# Patient Record
Sex: FEMALE | Race: WHITE | NOT HISPANIC OR LATINO
[De-identification: names, ages, dates, MRNs, and addresses within clinical notes are randomized per-mention and may not be internally consistent; named-entity substitution may affect disease eponyms.]

---

## 2017-01-08 ENCOUNTER — RX RENEWAL (OUTPATIENT)
Age: 77
End: 2017-01-08

## 2017-03-06 ENCOUNTER — APPOINTMENT (OUTPATIENT)
Dept: RHEUMATOLOGY | Facility: CLINIC | Age: 77
End: 2017-03-06

## 2017-03-06 VITALS
OXYGEN SATURATION: 97 % | DIASTOLIC BLOOD PRESSURE: 82 MMHG | BODY MASS INDEX: 29.02 KG/M2 | WEIGHT: 170 LBS | HEIGHT: 64 IN | HEART RATE: 51 BPM | SYSTOLIC BLOOD PRESSURE: 126 MMHG

## 2017-03-06 DIAGNOSIS — I99.8 OTHER DISORDER OF CIRCULATORY SYSTEM: ICD-10-CM

## 2017-03-07 LAB
ALBUMIN SERPL ELPH-MCNC: 4 G/DL
ALP BLD-CCNC: 73 U/L
ALT SERPL-CCNC: 15 U/L
ANION GAP SERPL CALC-SCNC: 14 MMOL/L
AST SERPL-CCNC: 19 U/L
BASOPHILS # BLD AUTO: 0.06 K/UL
BASOPHILS NFR BLD AUTO: 0.8 %
BILIRUB SERPL-MCNC: 0.5 MG/DL
BUN SERPL-MCNC: 20 MG/DL
CALCIUM SERPL-MCNC: 9.9 MG/DL
CHLORIDE SERPL-SCNC: 104 MMOL/L
CO2 SERPL-SCNC: 28 MMOL/L
CREAT SERPL-MCNC: 0.94 MG/DL
CRP SERPL-MCNC: 0.87 MG/DL
EOSINOPHIL # BLD AUTO: 0.32 K/UL
EOSINOPHIL NFR BLD AUTO: 4.1 %
GLUCOSE SERPL-MCNC: 103 MG/DL
HCT VFR BLD CALC: 36.3 %
HGB BLD-MCNC: 11.6 G/DL
IMM GRANULOCYTES NFR BLD AUTO: 0.3 %
LYMPHOCYTES # BLD AUTO: 1.65 K/UL
LYMPHOCYTES NFR BLD AUTO: 21.1 %
MAN DIFF?: NORMAL
MCHC RBC-ENTMCNC: 31.5 PG
MCHC RBC-ENTMCNC: 32 GM/DL
MCV RBC AUTO: 98.6 FL
MONOCYTES # BLD AUTO: 0.54 K/UL
MONOCYTES NFR BLD AUTO: 6.9 %
NEUTROPHILS # BLD AUTO: 5.23 K/UL
NEUTROPHILS NFR BLD AUTO: 66.8 %
PLATELET # BLD AUTO: 353 K/UL
POTASSIUM SERPL-SCNC: 3.7 MMOL/L
PROT SERPL-MCNC: 6.5 G/DL
RBC # BLD: 3.68 M/UL
RBC # FLD: 16.9 %
SODIUM SERPL-SCNC: 146 MMOL/L
WBC # FLD AUTO: 7.82 K/UL

## 2017-05-03 ENCOUNTER — RX RENEWAL (OUTPATIENT)
Age: 77
End: 2017-05-03

## 2017-05-15 ENCOUNTER — APPOINTMENT (OUTPATIENT)
Dept: OBGYN | Facility: CLINIC | Age: 77
End: 2017-05-15

## 2017-05-15 VITALS
SYSTOLIC BLOOD PRESSURE: 132 MMHG | HEIGHT: 64 IN | BODY MASS INDEX: 28.51 KG/M2 | DIASTOLIC BLOOD PRESSURE: 70 MMHG | WEIGHT: 167 LBS

## 2017-05-19 LAB — CYTOLOGY CVX/VAG DOC THIN PREP: NORMAL

## 2017-06-08 ENCOUNTER — FORM ENCOUNTER (OUTPATIENT)
Age: 77
End: 2017-06-08

## 2017-06-08 ENCOUNTER — APPOINTMENT (OUTPATIENT)
Dept: RHEUMATOLOGY | Facility: CLINIC | Age: 77
End: 2017-06-08

## 2017-06-08 VITALS
HEART RATE: 52 BPM | BODY MASS INDEX: 28.17 KG/M2 | DIASTOLIC BLOOD PRESSURE: 68 MMHG | HEIGHT: 64 IN | OXYGEN SATURATION: 97 % | SYSTOLIC BLOOD PRESSURE: 111 MMHG | WEIGHT: 165 LBS | TEMPERATURE: 97 F

## 2017-06-08 LAB
ALBUMIN SERPL ELPH-MCNC: 4.1 G/DL
ALP BLD-CCNC: 76 U/L
ALT SERPL-CCNC: 14 U/L
ANION GAP SERPL CALC-SCNC: 12 MMOL/L
AST SERPL-CCNC: 17 U/L
BASOPHILS # BLD AUTO: 0.08 K/UL
BASOPHILS NFR BLD AUTO: 1 %
BILIRUB SERPL-MCNC: 0.3 MG/DL
BUN SERPL-MCNC: 17 MG/DL
CALCIUM SERPL-MCNC: 9.4 MG/DL
CHLORIDE SERPL-SCNC: 101 MMOL/L
CO2 SERPL-SCNC: 29 MMOL/L
CREAT SERPL-MCNC: 0.92 MG/DL
CRP SERPL-MCNC: 1 MG/DL
EOSINOPHIL # BLD AUTO: 0.3 K/UL
EOSINOPHIL NFR BLD AUTO: 3.7 %
GLUCOSE SERPL-MCNC: 73 MG/DL
HCT VFR BLD CALC: 37.4 %
HGB BLD-MCNC: 11.9 G/DL
IMM GRANULOCYTES NFR BLD AUTO: 0.2 %
LYMPHOCYTES # BLD AUTO: 1.62 K/UL
LYMPHOCYTES NFR BLD AUTO: 20.1 %
MAN DIFF?: NORMAL
MCHC RBC-ENTMCNC: 31.8 GM/DL
MCHC RBC-ENTMCNC: 32.1 PG
MCV RBC AUTO: 100.8 FL
MONOCYTES # BLD AUTO: 0.41 K/UL
MONOCYTES NFR BLD AUTO: 5.1 %
NEUTROPHILS # BLD AUTO: 5.63 K/UL
NEUTROPHILS NFR BLD AUTO: 69.9 %
PLATELET # BLD AUTO: 336 K/UL
POTASSIUM SERPL-SCNC: 3.8 MMOL/L
PROT SERPL-MCNC: 6.9 G/DL
RBC # BLD: 3.71 M/UL
RBC # FLD: 14.6 %
SODIUM SERPL-SCNC: 142 MMOL/L
WBC # FLD AUTO: 8.06 K/UL

## 2017-06-09 ENCOUNTER — OUTPATIENT (OUTPATIENT)
Dept: OUTPATIENT SERVICES | Facility: HOSPITAL | Age: 77
LOS: 1 days | End: 2017-06-09
Payer: MEDICARE

## 2017-06-09 ENCOUNTER — APPOINTMENT (OUTPATIENT)
Dept: RADIOLOGY | Facility: CLINIC | Age: 77
End: 2017-06-09

## 2017-06-09 DIAGNOSIS — M54.2 CERVICALGIA: ICD-10-CM

## 2017-06-09 LAB — 25(OH)D3 SERPL-MCNC: 38.6 NG/ML

## 2017-06-09 PROCEDURE — 72050 X-RAY EXAM NECK SPINE 4/5VWS: CPT

## 2017-08-06 ENCOUNTER — RX RENEWAL (OUTPATIENT)
Age: 77
End: 2017-08-06

## 2017-08-25 ENCOUNTER — APPOINTMENT (OUTPATIENT)
Dept: RHEUMATOLOGY | Facility: CLINIC | Age: 77
End: 2017-08-25
Payer: MEDICARE

## 2017-08-25 PROCEDURE — 96374 THER/PROPH/DIAG INJ IV PUSH: CPT

## 2017-09-14 ENCOUNTER — APPOINTMENT (OUTPATIENT)
Dept: RHEUMATOLOGY | Facility: CLINIC | Age: 77
End: 2017-09-14
Payer: MEDICARE

## 2017-09-14 VITALS
TEMPERATURE: 97.8 F | WEIGHT: 161 LBS | OXYGEN SATURATION: 96 % | SYSTOLIC BLOOD PRESSURE: 96 MMHG | HEIGHT: 64 IN | DIASTOLIC BLOOD PRESSURE: 56 MMHG | HEART RATE: 52 BPM | BODY MASS INDEX: 27.49 KG/M2

## 2017-09-14 LAB
ALBUMIN SERPL ELPH-MCNC: 4.3 G/DL
ALP BLD-CCNC: 82 U/L
ALT SERPL-CCNC: 12 U/L
ANION GAP SERPL CALC-SCNC: 14 MMOL/L
AST SERPL-CCNC: 14 U/L
BASOPHILS # BLD AUTO: 0.06 K/UL
BASOPHILS NFR BLD AUTO: 0.9 %
BILIRUB SERPL-MCNC: 0.6 MG/DL
BUN SERPL-MCNC: 19 MG/DL
CALCIUM SERPL-MCNC: 9.2 MG/DL
CHLORIDE SERPL-SCNC: 101 MMOL/L
CO2 SERPL-SCNC: 29 MMOL/L
CREAT SERPL-MCNC: 1.14 MG/DL
CRP SERPL-MCNC: 1.1 MG/DL
EOSINOPHIL # BLD AUTO: 0.26 K/UL
EOSINOPHIL NFR BLD AUTO: 4 %
GLUCOSE SERPL-MCNC: 88 MG/DL
HCT VFR BLD CALC: 36.2 %
HGB BLD-MCNC: 11.7 G/DL
IMM GRANULOCYTES NFR BLD AUTO: 0.2 %
LYMPHOCYTES # BLD AUTO: 1.29 K/UL
LYMPHOCYTES NFR BLD AUTO: 19.7 %
MAN DIFF?: NORMAL
MCHC RBC-ENTMCNC: 31.8 PG
MCHC RBC-ENTMCNC: 32.3 GM/DL
MCV RBC AUTO: 98.4 FL
MONOCYTES # BLD AUTO: 0.36 K/UL
MONOCYTES NFR BLD AUTO: 5.5 %
NEUTROPHILS # BLD AUTO: 4.58 K/UL
NEUTROPHILS NFR BLD AUTO: 69.7 %
PLATELET # BLD AUTO: 340 K/UL
POTASSIUM SERPL-SCNC: 3.5 MMOL/L
PROT SERPL-MCNC: 6.6 G/DL
RBC # BLD: 3.68 M/UL
RBC # FLD: 15.8 %
SODIUM SERPL-SCNC: 144 MMOL/L
TSH SERPL-ACNC: 2.47 UIU/ML
WBC # FLD AUTO: 6.56 K/UL

## 2017-09-14 PROCEDURE — 90662 IIV NO PRSV INCREASED AG IM: CPT

## 2017-09-14 PROCEDURE — 99214 OFFICE O/P EST MOD 30 MIN: CPT | Mod: 25

## 2017-09-14 PROCEDURE — G0008: CPT

## 2017-10-05 ENCOUNTER — MEDICATION RENEWAL (OUTPATIENT)
Age: 77
End: 2017-10-05

## 2017-10-11 ENCOUNTER — MEDICATION RENEWAL (OUTPATIENT)
Age: 77
End: 2017-10-11

## 2017-11-07 ENCOUNTER — RX RENEWAL (OUTPATIENT)
Age: 77
End: 2017-11-07

## 2017-12-14 ENCOUNTER — RX RENEWAL (OUTPATIENT)
Age: 77
End: 2017-12-14

## 2017-12-14 ENCOUNTER — APPOINTMENT (OUTPATIENT)
Dept: RHEUMATOLOGY | Facility: CLINIC | Age: 77
End: 2017-12-14
Payer: MEDICARE

## 2017-12-14 VITALS
OXYGEN SATURATION: 96 % | WEIGHT: 162 LBS | HEIGHT: 64 IN | HEART RATE: 64 BPM | DIASTOLIC BLOOD PRESSURE: 74 MMHG | BODY MASS INDEX: 27.66 KG/M2 | TEMPERATURE: 98.1 F | SYSTOLIC BLOOD PRESSURE: 118 MMHG

## 2017-12-14 LAB
ALBUMIN SERPL ELPH-MCNC: 4.3 G/DL
ALP BLD-CCNC: 75 U/L
ALT SERPL-CCNC: 12 U/L
ANION GAP SERPL CALC-SCNC: 15 MMOL/L
AST SERPL-CCNC: 18 U/L
BASOPHILS # BLD AUTO: 0.08 K/UL
BASOPHILS NFR BLD AUTO: 0.8 %
BILIRUB SERPL-MCNC: 0.6 MG/DL
BUN SERPL-MCNC: 17 MG/DL
CALCIUM SERPL-MCNC: 9.5 MG/DL
CHLORIDE SERPL-SCNC: 102 MMOL/L
CO2 SERPL-SCNC: 28 MMOL/L
CREAT SERPL-MCNC: 1.14 MG/DL
CRP SERPL-MCNC: 2 MG/DL
EOSINOPHIL # BLD AUTO: 0.62 K/UL
EOSINOPHIL NFR BLD AUTO: 5.9 %
GLUCOSE SERPL-MCNC: 128 MG/DL
HCT VFR BLD CALC: 36.3 %
HGB BLD-MCNC: 12.1 G/DL
IMM GRANULOCYTES NFR BLD AUTO: 0.4 %
LYMPHOCYTES # BLD AUTO: 1.37 K/UL
LYMPHOCYTES NFR BLD AUTO: 13.1 %
MAN DIFF?: NORMAL
MCHC RBC-ENTMCNC: 32.7 PG
MCHC RBC-ENTMCNC: 33.3 GM/DL
MCV RBC AUTO: 98.1 FL
MONOCYTES # BLD AUTO: 0.64 K/UL
MONOCYTES NFR BLD AUTO: 6.1 %
NEUTROPHILS # BLD AUTO: 7.68 K/UL
NEUTROPHILS NFR BLD AUTO: 73.7 %
PLATELET # BLD AUTO: 343 K/UL
POTASSIUM SERPL-SCNC: 3.2 MMOL/L
PROT SERPL-MCNC: 6.7 G/DL
RBC # BLD: 3.7 M/UL
RBC # FLD: 14.3 %
SODIUM SERPL-SCNC: 145 MMOL/L
WBC # FLD AUTO: 10.43 K/UL

## 2017-12-14 PROCEDURE — 99214 OFFICE O/P EST MOD 30 MIN: CPT

## 2017-12-14 RX ORDER — ATENOLOL 25 MG/1
25 TABLET ORAL DAILY
Qty: 30 | Refills: 3 | Status: DISCONTINUED | COMMUNITY
Start: 2017-10-05 | End: 2017-12-14

## 2018-01-10 ENCOUNTER — RX RENEWAL (OUTPATIENT)
Age: 78
End: 2018-01-10

## 2018-02-03 ENCOUNTER — RX RENEWAL (OUTPATIENT)
Age: 78
End: 2018-02-03

## 2018-04-12 ENCOUNTER — RX RENEWAL (OUTPATIENT)
Age: 78
End: 2018-04-12

## 2018-04-19 ENCOUNTER — APPOINTMENT (OUTPATIENT)
Dept: RHEUMATOLOGY | Facility: CLINIC | Age: 78
End: 2018-04-19
Payer: MEDICARE

## 2018-04-19 VITALS
HEIGHT: 64 IN | HEART RATE: 103 BPM | TEMPERATURE: 98.1 F | OXYGEN SATURATION: 97 % | SYSTOLIC BLOOD PRESSURE: 130 MMHG | BODY MASS INDEX: 27.66 KG/M2 | WEIGHT: 162 LBS | DIASTOLIC BLOOD PRESSURE: 71 MMHG

## 2018-04-19 LAB
ALBUMIN SERPL ELPH-MCNC: 4.2 G/DL
ALP BLD-CCNC: 74 U/L
ALT SERPL-CCNC: 12 U/L
ANION GAP SERPL CALC-SCNC: 12 MMOL/L
AST SERPL-CCNC: 15 U/L
BASOPHILS # BLD AUTO: 0.06 K/UL
BASOPHILS NFR BLD AUTO: 0.5 %
BILIRUB SERPL-MCNC: 0.5 MG/DL
BUN SERPL-MCNC: 26 MG/DL
CALCIUM SERPL-MCNC: 9.5 MG/DL
CHLORIDE SERPL-SCNC: 98 MMOL/L
CO2 SERPL-SCNC: 31 MMOL/L
CREAT SERPL-MCNC: 1.17 MG/DL
CRP SERPL-MCNC: 5 MG/DL
EOSINOPHIL # BLD AUTO: 0.69 K/UL
EOSINOPHIL NFR BLD AUTO: 5.4 %
GLUCOSE SERPL-MCNC: 78 MG/DL
HCT VFR BLD CALC: 36.8 %
HGB BLD-MCNC: 12.2 G/DL
IMM GRANULOCYTES NFR BLD AUTO: 0.2 %
LYMPHOCYTES # BLD AUTO: 1.61 K/UL
LYMPHOCYTES NFR BLD AUTO: 12.6 %
MAN DIFF?: NORMAL
MCHC RBC-ENTMCNC: 32.3 PG
MCHC RBC-ENTMCNC: 33.2 GM/DL
MCV RBC AUTO: 97.4 FL
MONOCYTES # BLD AUTO: 0.6 K/UL
MONOCYTES NFR BLD AUTO: 4.7 %
NEUTROPHILS # BLD AUTO: 9.8 K/UL
NEUTROPHILS NFR BLD AUTO: 76.6 %
PLATELET # BLD AUTO: 377 K/UL
POTASSIUM SERPL-SCNC: 3.5 MMOL/L
PROT SERPL-MCNC: 7 G/DL
RBC # BLD: 3.78 M/UL
RBC # FLD: 14.5 %
SODIUM SERPL-SCNC: 141 MMOL/L
WBC # FLD AUTO: 12.78 K/UL

## 2018-04-19 PROCEDURE — 99214 OFFICE O/P EST MOD 30 MIN: CPT

## 2018-05-11 ENCOUNTER — RX RENEWAL (OUTPATIENT)
Age: 78
End: 2018-05-11

## 2018-05-18 ENCOUNTER — APPOINTMENT (OUTPATIENT)
Dept: OBGYN | Facility: CLINIC | Age: 78
End: 2018-05-18
Payer: MEDICARE

## 2018-05-18 VITALS
HEIGHT: 66 IN | BODY MASS INDEX: 26.36 KG/M2 | DIASTOLIC BLOOD PRESSURE: 70 MMHG | SYSTOLIC BLOOD PRESSURE: 120 MMHG | WEIGHT: 164 LBS

## 2018-05-18 DIAGNOSIS — Z12.4 ENCOUNTER FOR SCREENING FOR MALIGNANT NEOPLASM OF CERVIX: ICD-10-CM

## 2018-05-18 PROCEDURE — G0101: CPT

## 2018-05-18 RX ORDER — AZITHROMYCIN 250 MG/1
250 TABLET, FILM COATED ORAL
Qty: 6 | Refills: 3 | Status: COMPLETED | COMMUNITY
Start: 2017-12-14 | End: 2018-05-18

## 2018-05-18 RX ORDER — CHLORHEXIDINE GLUCONATE, 0.12% ORAL RINSE 1.2 MG/ML
0.12 SOLUTION DENTAL
Qty: 473 | Refills: 0 | Status: COMPLETED | COMMUNITY
Start: 2018-05-04

## 2018-08-05 ENCOUNTER — FORM ENCOUNTER (OUTPATIENT)
Age: 78
End: 2018-08-05

## 2018-08-06 ENCOUNTER — APPOINTMENT (OUTPATIENT)
Dept: RHEUMATOLOGY | Facility: CLINIC | Age: 78
End: 2018-08-06
Payer: MEDICARE

## 2018-08-06 ENCOUNTER — RX RENEWAL (OUTPATIENT)
Age: 78
End: 2018-08-06

## 2018-08-06 VITALS
WEIGHT: 160 LBS | RESPIRATION RATE: 14 BRPM | BODY MASS INDEX: 25.71 KG/M2 | SYSTOLIC BLOOD PRESSURE: 117 MMHG | TEMPERATURE: 98 F | DIASTOLIC BLOOD PRESSURE: 72 MMHG | HEIGHT: 66 IN | OXYGEN SATURATION: 97 % | HEART RATE: 99 BPM

## 2018-08-06 DIAGNOSIS — M25.561 PAIN IN RIGHT KNEE: ICD-10-CM

## 2018-08-06 LAB
25(OH)D3 SERPL-MCNC: 45.2 NG/ML
ALBUMIN SERPL ELPH-MCNC: 4.6 G/DL
ALP BLD-CCNC: 78 U/L
ALT SERPL-CCNC: 12 U/L
ANION GAP SERPL CALC-SCNC: 16 MMOL/L
AST SERPL-CCNC: 17 U/L
BASOPHILS # BLD AUTO: 0.06 K/UL
BASOPHILS NFR BLD AUTO: 0.8 %
BILIRUB SERPL-MCNC: 0.5 MG/DL
BUN SERPL-MCNC: 23 MG/DL
CALCIUM SERPL-MCNC: 9.4 MG/DL
CHLORIDE SERPL-SCNC: 100 MMOL/L
CO2 SERPL-SCNC: 27 MMOL/L
CREAT SERPL-MCNC: 1.12 MG/DL
CRP SERPL-MCNC: 0.77 MG/DL
EOSINOPHIL # BLD AUTO: 0.47 K/UL
EOSINOPHIL NFR BLD AUTO: 6.5 %
ERYTHROCYTE [SEDIMENTATION RATE] IN BLOOD BY WESTERGREN METHOD: 26 MM/HR
GLUCOSE SERPL-MCNC: 80 MG/DL
HCT VFR BLD CALC: 35.6 %
HGB BLD-MCNC: 11.9 G/DL
IMM GRANULOCYTES NFR BLD AUTO: 0.4 %
LYMPHOCYTES # BLD AUTO: 1.24 K/UL
LYMPHOCYTES NFR BLD AUTO: 17 %
MAN DIFF?: NORMAL
MCHC RBC-ENTMCNC: 32.7 PG
MCHC RBC-ENTMCNC: 33.4 GM/DL
MCV RBC AUTO: 97.8 FL
MONOCYTES # BLD AUTO: 0.82 K/UL
MONOCYTES NFR BLD AUTO: 11.3 %
NEUTROPHILS # BLD AUTO: 4.66 K/UL
NEUTROPHILS NFR BLD AUTO: 64 %
PLATELET # BLD AUTO: 276 K/UL
POTASSIUM SERPL-SCNC: 4 MMOL/L
PROT SERPL-MCNC: 6.7 G/DL
RBC # BLD: 3.64 M/UL
RBC # FLD: 16.4 %
SODIUM SERPL-SCNC: 143 MMOL/L
TSH SERPL-ACNC: 2.44 UIU/ML
WBC # FLD AUTO: 7.28 K/UL

## 2018-08-06 PROCEDURE — 99214 OFFICE O/P EST MOD 30 MIN: CPT

## 2018-08-06 PROCEDURE — 73630 X-RAY EXAM OF FOOT: CPT | Mod: RT

## 2018-08-07 ENCOUNTER — APPOINTMENT (OUTPATIENT)
Dept: RHEUMATOLOGY | Facility: CLINIC | Age: 78
End: 2018-08-07

## 2018-09-28 ENCOUNTER — APPOINTMENT (OUTPATIENT)
Dept: RHEUMATOLOGY | Facility: CLINIC | Age: 78
End: 2018-09-28
Payer: MEDICARE

## 2018-09-28 PROCEDURE — 96374 THER/PROPH/DIAG INJ IV PUSH: CPT

## 2018-11-12 ENCOUNTER — APPOINTMENT (OUTPATIENT)
Dept: RHEUMATOLOGY | Facility: CLINIC | Age: 78
End: 2018-11-12
Payer: MEDICARE

## 2018-11-12 VITALS
HEIGHT: 66 IN | WEIGHT: 164 LBS | OXYGEN SATURATION: 98 % | TEMPERATURE: 98.4 F | SYSTOLIC BLOOD PRESSURE: 127 MMHG | DIASTOLIC BLOOD PRESSURE: 79 MMHG | BODY MASS INDEX: 26.36 KG/M2 | HEART RATE: 66 BPM

## 2018-11-12 PROCEDURE — G0008: CPT

## 2018-11-12 PROCEDURE — 99214 OFFICE O/P EST MOD 30 MIN: CPT | Mod: 25

## 2018-11-12 PROCEDURE — 90662 IIV NO PRSV INCREASED AG IM: CPT

## 2018-11-13 LAB
25(OH)D3 SERPL-MCNC: 48.4 NG/ML
ALBUMIN SERPL ELPH-MCNC: 4.1 G/DL
ALP BLD-CCNC: 72 U/L
ALT SERPL-CCNC: 12 U/L
ANION GAP SERPL CALC-SCNC: 12 MMOL/L
AST SERPL-CCNC: 15 U/L
BASOPHILS # BLD AUTO: 0.05 K/UL
BASOPHILS NFR BLD AUTO: 0.6 %
BILIRUB SERPL-MCNC: 0.6 MG/DL
BUN SERPL-MCNC: 21 MG/DL
CALCIUM SERPL-MCNC: 9.6 MG/DL
CHLORIDE SERPL-SCNC: 101 MMOL/L
CO2 SERPL-SCNC: 29 MMOL/L
CREAT SERPL-MCNC: 1.05 MG/DL
CRP SERPL-MCNC: 0.92 MG/DL
EOSINOPHIL # BLD AUTO: 0.42 K/UL
EOSINOPHIL NFR BLD AUTO: 4.8 %
GLUCOSE SERPL-MCNC: 80 MG/DL
HCT VFR BLD CALC: 35.4 %
HGB BLD-MCNC: 11.4 G/DL
IMM GRANULOCYTES NFR BLD AUTO: 0.2 %
LYMPHOCYTES # BLD AUTO: 0.85 K/UL
LYMPHOCYTES NFR BLD AUTO: 9.7 %
MAN DIFF?: NORMAL
MCHC RBC-ENTMCNC: 32.2 GM/DL
MCHC RBC-ENTMCNC: 32.7 PG
MCV RBC AUTO: 101.4 FL
MONOCYTES # BLD AUTO: 0.99 K/UL
MONOCYTES NFR BLD AUTO: 11.3 %
NEUTROPHILS # BLD AUTO: 6.43 K/UL
NEUTROPHILS NFR BLD AUTO: 73.4 %
PLATELET # BLD AUTO: 306 K/UL
POTASSIUM SERPL-SCNC: 3.4 MMOL/L
PROT SERPL-MCNC: 6.6 G/DL
RBC # BLD: 3.49 M/UL
RBC # FLD: 15.3 %
SODIUM SERPL-SCNC: 142 MMOL/L
WBC # FLD AUTO: 8.76 K/UL

## 2019-02-19 ENCOUNTER — RX RENEWAL (OUTPATIENT)
Age: 79
End: 2019-02-19

## 2019-02-25 ENCOUNTER — APPOINTMENT (OUTPATIENT)
Dept: RHEUMATOLOGY | Facility: CLINIC | Age: 79
End: 2019-02-25
Payer: MEDICARE

## 2019-02-25 ENCOUNTER — APPOINTMENT (OUTPATIENT)
Dept: ENDOCRINOLOGY | Facility: CLINIC | Age: 79
End: 2019-02-25
Payer: MEDICARE

## 2019-02-25 VITALS
SYSTOLIC BLOOD PRESSURE: 120 MMHG | HEART RATE: 62 BPM | WEIGHT: 160 LBS | BODY MASS INDEX: 25.71 KG/M2 | DIASTOLIC BLOOD PRESSURE: 82 MMHG | HEIGHT: 66 IN

## 2019-02-25 PROCEDURE — 77080 DXA BONE DENSITY AXIAL: CPT | Mod: GA

## 2019-02-25 PROCEDURE — 99214 OFFICE O/P EST MOD 30 MIN: CPT

## 2019-02-26 LAB
25(OH)D3 SERPL-MCNC: 41.8 NG/ML
ALBUMIN SERPL ELPH-MCNC: 4.4 G/DL
ALP BLD-CCNC: 85 U/L
ALT SERPL-CCNC: 14 U/L
ANION GAP SERPL CALC-SCNC: 15 MMOL/L
AST SERPL-CCNC: 21 U/L
BASOPHILS # BLD AUTO: 0.08 K/UL
BASOPHILS NFR BLD AUTO: 0.7 %
BILIRUB SERPL-MCNC: 0.6 MG/DL
BUN SERPL-MCNC: 19 MG/DL
CALCIUM SERPL-MCNC: 10.1 MG/DL
CHLORIDE SERPL-SCNC: 99 MMOL/L
CO2 SERPL-SCNC: 28 MMOL/L
CREAT SERPL-MCNC: 1.16 MG/DL
CRP SERPL-MCNC: 2.14 MG/DL
EOSINOPHIL # BLD AUTO: 0.34 K/UL
EOSINOPHIL NFR BLD AUTO: 3 %
GLUCOSE SERPL-MCNC: 88 MG/DL
HCT VFR BLD CALC: 38 %
HGB BLD-MCNC: 12.5 G/DL
IMM GRANULOCYTES NFR BLD AUTO: 0.3 %
LYMPHOCYTES # BLD AUTO: 1.3 K/UL
LYMPHOCYTES NFR BLD AUTO: 11.5 %
MAN DIFF?: NORMAL
MCHC RBC-ENTMCNC: 32.8 PG
MCHC RBC-ENTMCNC: 32.9 GM/DL
MCV RBC AUTO: 99.7 FL
MONOCYTES # BLD AUTO: 1.4 K/UL
MONOCYTES NFR BLD AUTO: 12.4 %
NEUTROPHILS # BLD AUTO: 8.16 K/UL
NEUTROPHILS NFR BLD AUTO: 72.1 %
PLATELET # BLD AUTO: 340 K/UL
POTASSIUM SERPL-SCNC: 3.9 MMOL/L
PROT SERPL-MCNC: 7 G/DL
RBC # BLD: 3.81 M/UL
RBC # FLD: 15 %
SODIUM SERPL-SCNC: 142 MMOL/L
WBC # FLD AUTO: 11.31 K/UL

## 2019-06-07 ENCOUNTER — APPOINTMENT (OUTPATIENT)
Dept: OBGYN | Facility: CLINIC | Age: 79
End: 2019-06-07
Payer: MEDICARE

## 2019-06-07 VITALS — HEIGHT: 66 IN | HEART RATE: 71 BPM | DIASTOLIC BLOOD PRESSURE: 76 MMHG | SYSTOLIC BLOOD PRESSURE: 115 MMHG

## 2019-06-07 DIAGNOSIS — Z87.39 PERSONAL HISTORY OF OTHER DISEASES OF THE MUSCULOSKELETAL SYSTEM AND CONNECTIVE TISSUE: ICD-10-CM

## 2019-06-07 DIAGNOSIS — I48.91 UNSPECIFIED ATRIAL FIBRILLATION: ICD-10-CM

## 2019-06-07 DIAGNOSIS — N95.2 POSTMENOPAUSAL ATROPHIC VAGINITIS: ICD-10-CM

## 2019-06-07 PROCEDURE — 99213 OFFICE O/P EST LOW 20 MIN: CPT

## 2019-06-07 RX ORDER — APIXABAN 2.5 MG/1
2.5 TABLET, FILM COATED ORAL
Refills: 0 | Status: ACTIVE | COMMUNITY

## 2019-06-07 RX ORDER — ASPIRIN 81 MG/1
81 TABLET, CHEWABLE ORAL
Refills: 0 | Status: COMPLETED | COMMUNITY
Start: 2017-05-15 | End: 2019-06-07

## 2019-06-07 NOTE — OB HISTORY
[___] : no pregnancy complications reported [Pregnancy History] : patient did not receive anesthesia

## 2019-06-07 NOTE — HISTORY OF PRESENT ILLNESS
[1 Year Ago] : 1 year ago [Good] : being in good health [Healthy Diet] : a healthy diet [Regular Exercise] : regular exercise [Last Mammogram ___] : Last Mammogram was [unfilled] [Last Bone Density ___] : Last bone density studies [unfilled] [Last Colonoscopy ___] : Last colonoscopy [unfilled] [Last Pap ___] : Last cervical pap smear was [unfilled] [Postmenopausal] : is postmenopausal [Pregnancy History] : pregnancy history: [Up to Date] : up to date with ~his/her~ STD screening [Menstrual Problems] : reports normal menses [Weight Concerns] : no concerns with her weight

## 2019-06-07 NOTE — CHIEF COMPLAINT
[Annual Visit] : annual visit [FreeTextEntry1] : 80 YO P2 LMP 1995 s/p Right breast Ca 2005 s/p excision/LN's(19-)/RT s/p Arimidex X5yrs presents with c/o dry vagina and osteoporosis.

## 2019-06-07 NOTE — PHYSICAL EXAM
[Awake] : awake [Alert] : alert [Thyroid Nodule] : no thyroid nodule [Acute Distress] : no acute distress [LAD] : no lymphadenopathy [Mass] : no breast mass [Goiter] : no goiter [Nipple Discharge] : no nipple discharge [Axillary LAD] : no axillary lymphadenopathy [Soft] : soft [Tender] : non tender [Oriented x3] : oriented to person, place, and time [Distended] : not distended [H/Smegaly] : no hepatosplenomegaly [Flat Affect] : affect not flat [Depressed Mood] : not depressed [Atrophy] : atrophy [Normal] : uterus [Anteversion] : anteverted [No Bleeding] : there was no active vaginal bleeding [Tenderness] : nontender [Enlarged ___ wks] : not enlarged [Uterine Adnexae] : were not tender and not enlarged [RRR, No Murmurs] : RRR, no murmurs [CTAB] : CTAB

## 2019-06-18 ENCOUNTER — APPOINTMENT (OUTPATIENT)
Dept: RHEUMATOLOGY | Facility: CLINIC | Age: 79
End: 2019-06-18
Payer: MEDICARE

## 2019-06-18 VITALS
HEIGHT: 65 IN | BODY MASS INDEX: 26.33 KG/M2 | DIASTOLIC BLOOD PRESSURE: 64 MMHG | TEMPERATURE: 98.6 F | HEART RATE: 58 BPM | SYSTOLIC BLOOD PRESSURE: 107 MMHG | WEIGHT: 158 LBS

## 2019-06-18 PROCEDURE — 99214 OFFICE O/P EST MOD 30 MIN: CPT

## 2019-06-19 LAB
ALBUMIN SERPL ELPH-MCNC: 4 G/DL
ALP BLD-CCNC: 73 U/L
ALT SERPL-CCNC: 16 U/L
ANION GAP SERPL CALC-SCNC: 15 MMOL/L
AST SERPL-CCNC: 22 U/L
BASOPHILS # BLD AUTO: 0.05 K/UL
BASOPHILS NFR BLD AUTO: 0.6 %
BILIRUB SERPL-MCNC: 0.5 MG/DL
BUN SERPL-MCNC: 20 MG/DL
CALCIUM SERPL-MCNC: 9.5 MG/DL
CHLORIDE SERPL-SCNC: 100 MMOL/L
CO2 SERPL-SCNC: 27 MMOL/L
CREAT SERPL-MCNC: 1.19 MG/DL
CRP SERPL-MCNC: 0.85 MG/DL
EOSINOPHIL # BLD AUTO: 0.19 K/UL
EOSINOPHIL NFR BLD AUTO: 2.4 %
GLUCOSE SERPL-MCNC: 110 MG/DL
HCT VFR BLD CALC: 37.4 %
HGB BLD-MCNC: 12.1 G/DL
IMM GRANULOCYTES NFR BLD AUTO: 0.3 %
LYMPHOCYTES # BLD AUTO: 1.31 K/UL
LYMPHOCYTES NFR BLD AUTO: 16.5 %
MAN DIFF?: NORMAL
MCHC RBC-ENTMCNC: 32.4 GM/DL
MCHC RBC-ENTMCNC: 32.5 PG
MCV RBC AUTO: 100.5 FL
MONOCYTES # BLD AUTO: 0.79 K/UL
MONOCYTES NFR BLD AUTO: 10 %
NEUTROPHILS # BLD AUTO: 5.56 K/UL
NEUTROPHILS NFR BLD AUTO: 70.2 %
PLATELET # BLD AUTO: 303 K/UL
POTASSIUM SERPL-SCNC: 3.7 MMOL/L
PROT SERPL-MCNC: 6.4 G/DL
RBC # BLD: 3.72 M/UL
RBC # FLD: 15.1 %
SODIUM SERPL-SCNC: 142 MMOL/L
WBC # FLD AUTO: 7.92 K/UL

## 2019-10-01 ENCOUNTER — LABORATORY RESULT (OUTPATIENT)
Age: 79
End: 2019-10-01

## 2019-10-01 ENCOUNTER — APPOINTMENT (OUTPATIENT)
Dept: RHEUMATOLOGY | Facility: CLINIC | Age: 79
End: 2019-10-01
Payer: MEDICARE

## 2019-10-01 VITALS
SYSTOLIC BLOOD PRESSURE: 122 MMHG | DIASTOLIC BLOOD PRESSURE: 77 MMHG | TEMPERATURE: 99.8 F | HEART RATE: 58 BPM | WEIGHT: 158 LBS | BODY MASS INDEX: 26.33 KG/M2 | HEIGHT: 65 IN | OXYGEN SATURATION: 96 %

## 2019-10-01 DIAGNOSIS — M85.80 OTHER SPECIFIED DISORDERS OF BONE DENSITY AND STRUCTURE, UNSPECIFIED SITE: ICD-10-CM

## 2019-10-01 PROCEDURE — 99214 OFFICE O/P EST MOD 30 MIN: CPT

## 2019-11-01 ENCOUNTER — INPATIENT (INPATIENT)
Facility: HOSPITAL | Age: 79
LOS: 1 days | Discharge: HOME | End: 2019-11-03
Attending: INTERNAL MEDICINE | Admitting: INTERNAL MEDICINE
Payer: MEDICARE

## 2019-11-01 VITALS
DIASTOLIC BLOOD PRESSURE: 72 MMHG | HEIGHT: 67 IN | HEART RATE: 103 BPM | WEIGHT: 160.06 LBS | SYSTOLIC BLOOD PRESSURE: 144 MMHG | RESPIRATION RATE: 17 BRPM | TEMPERATURE: 97 F | OXYGEN SATURATION: 96 %

## 2019-11-01 DIAGNOSIS — E03.9 HYPOTHYROIDISM, UNSPECIFIED: ICD-10-CM

## 2019-11-01 DIAGNOSIS — I10 ESSENTIAL (PRIMARY) HYPERTENSION: ICD-10-CM

## 2019-11-01 DIAGNOSIS — Z29.9 ENCOUNTER FOR PROPHYLACTIC MEASURES, UNSPECIFIED: ICD-10-CM

## 2019-11-01 DIAGNOSIS — Z98.890 OTHER SPECIFIED POSTPROCEDURAL STATES: Chronic | ICD-10-CM

## 2019-11-01 DIAGNOSIS — R07.89 OTHER CHEST PAIN: ICD-10-CM

## 2019-11-01 DIAGNOSIS — E78.2 MIXED HYPERLIPIDEMIA: ICD-10-CM

## 2019-11-01 DIAGNOSIS — I48.20 CHRONIC ATRIAL FIBRILLATION, UNSPECIFIED: ICD-10-CM

## 2019-11-01 DIAGNOSIS — M06.9 RHEUMATOID ARTHRITIS, UNSPECIFIED: ICD-10-CM

## 2019-11-01 LAB
ALBUMIN SERPL ELPH-MCNC: 3.7 G/DL — SIGNIFICANT CHANGE UP (ref 3.3–5)
ALP SERPL-CCNC: 86 U/L — SIGNIFICANT CHANGE UP (ref 40–120)
ALT FLD-CCNC: 19 U/L — SIGNIFICANT CHANGE UP (ref 12–78)
ANION GAP SERPL CALC-SCNC: 8 MMOL/L — SIGNIFICANT CHANGE UP (ref 5–17)
APTT BLD: 31.2 SEC — SIGNIFICANT CHANGE UP (ref 27.5–36.3)
AST SERPL-CCNC: 17 U/L — SIGNIFICANT CHANGE UP (ref 15–37)
BASE EXCESS BLDA CALC-SCNC: 3.8 MMOL/L — HIGH (ref -2–2)
BILIRUB SERPL-MCNC: 0.6 MG/DL — SIGNIFICANT CHANGE UP (ref 0.2–1.2)
BLOOD GAS COMMENTS: SIGNIFICANT CHANGE UP
BLOOD GAS COMMENTS: SIGNIFICANT CHANGE UP
BLOOD GAS SOURCE: SIGNIFICANT CHANGE UP
BUN SERPL-MCNC: 21 MG/DL — SIGNIFICANT CHANGE UP (ref 7–23)
CALCIUM SERPL-MCNC: 9.3 MG/DL — SIGNIFICANT CHANGE UP (ref 8.5–10.1)
CHLORIDE SERPL-SCNC: 100 MMOL/L — SIGNIFICANT CHANGE UP (ref 96–108)
CK MB BLD-MCNC: <1 % — SIGNIFICANT CHANGE UP (ref 0–3.5)
CK MB BLD-MCNC: <1.4 % — SIGNIFICANT CHANGE UP (ref 0–3.5)
CK MB CFR SERPL CALC: <1 NG/ML — SIGNIFICANT CHANGE UP (ref 0.5–3.6)
CK MB CFR SERPL CALC: <1 NG/ML — SIGNIFICANT CHANGE UP (ref 0.5–3.6)
CK SERPL-CCNC: 100 U/L — SIGNIFICANT CHANGE UP (ref 26–192)
CK SERPL-CCNC: 71 U/L — SIGNIFICANT CHANGE UP (ref 26–192)
CO2 SERPL-SCNC: 29 MMOL/L — SIGNIFICANT CHANGE UP (ref 22–31)
CREAT SERPL-MCNC: 1.15 MG/DL — SIGNIFICANT CHANGE UP (ref 0.5–1.3)
FLU A RESULT: SIGNIFICANT CHANGE UP
FLU A RESULT: SIGNIFICANT CHANGE UP
FLUAV AG NPH QL: SIGNIFICANT CHANGE UP
FLUBV AG NPH QL: SIGNIFICANT CHANGE UP
GLUCOSE SERPL-MCNC: 113 MG/DL — HIGH (ref 70–99)
HCO3 BLDA-SCNC: 27 MMOL/L — SIGNIFICANT CHANGE UP (ref 21–29)
HCT VFR BLD CALC: 35.2 % — SIGNIFICANT CHANGE UP (ref 34.5–45)
HGB BLD-MCNC: 11.9 G/DL — SIGNIFICANT CHANGE UP (ref 11.5–15.5)
HOROWITZ INDEX BLDA+IHG-RTO: 28 — SIGNIFICANT CHANGE UP
INR BLD: 1.23 RATIO — HIGH (ref 0.88–1.16)
LACTATE SERPL-SCNC: 1.6 MMOL/L — SIGNIFICANT CHANGE UP (ref 0.7–2)
MCHC RBC-ENTMCNC: 33 PG — SIGNIFICANT CHANGE UP (ref 27–34)
MCHC RBC-ENTMCNC: 33.8 GM/DL — SIGNIFICANT CHANGE UP (ref 32–36)
MCV RBC AUTO: 97.5 FL — SIGNIFICANT CHANGE UP (ref 80–100)
NRBC # BLD: 0 /100 WBCS — SIGNIFICANT CHANGE UP (ref 0–0)
NT-PROBNP SERPL-SCNC: 1878 PG/ML — HIGH (ref 0–450)
PCO2 BLDA: 36 MMHG — SIGNIFICANT CHANGE UP (ref 32–46)
PH BLD: 7.48 — HIGH (ref 7.35–7.45)
PLATELET # BLD AUTO: 297 K/UL — SIGNIFICANT CHANGE UP (ref 150–400)
PO2 BLDA: 82 MMHG — SIGNIFICANT CHANGE UP (ref 74–108)
POTASSIUM SERPL-MCNC: 3.1 MMOL/L — LOW (ref 3.5–5.3)
POTASSIUM SERPL-SCNC: 3.1 MMOL/L — LOW (ref 3.5–5.3)
PROCALCITONIN SERPL-MCNC: 0.03 NG/ML — SIGNIFICANT CHANGE UP (ref 0.02–0.1)
PROT SERPL-MCNC: 7.4 GM/DL — SIGNIFICANT CHANGE UP (ref 6–8.3)
PROTHROM AB SERPL-ACNC: 13.9 SEC — HIGH (ref 10–12.9)
RBC # BLD: 3.61 M/UL — LOW (ref 3.8–5.2)
RBC # FLD: 15.2 % — HIGH (ref 10.3–14.5)
RSV RESULT: SIGNIFICANT CHANGE UP
RSV RNA RESP QL NAA+PROBE: SIGNIFICANT CHANGE UP
SAO2 % BLDA: 96 % — SIGNIFICANT CHANGE UP (ref 92–96)
SODIUM SERPL-SCNC: 137 MMOL/L — SIGNIFICANT CHANGE UP (ref 135–145)
TROPONIN I SERPL-MCNC: <.015 NG/ML — SIGNIFICANT CHANGE UP (ref 0.01–0.04)
WBC # BLD: 13.4 K/UL — HIGH (ref 3.8–10.5)
WBC # FLD AUTO: 13.4 K/UL — HIGH (ref 3.8–10.5)

## 2019-11-01 PROCEDURE — 93306 TTE W/DOPPLER COMPLETE: CPT | Mod: 26

## 2019-11-01 PROCEDURE — 99285 EMERGENCY DEPT VISIT HI MDM: CPT

## 2019-11-01 PROCEDURE — 99223 1ST HOSP IP/OBS HIGH 75: CPT

## 2019-11-01 PROCEDURE — 71275 CT ANGIOGRAPHY CHEST: CPT | Mod: 26

## 2019-11-01 PROCEDURE — 93010 ELECTROCARDIOGRAM REPORT: CPT

## 2019-11-01 PROCEDURE — 99223 1ST HOSP IP/OBS HIGH 75: CPT | Mod: AI

## 2019-11-01 RX ORDER — APIXABAN 2.5 MG/1
5 TABLET, FILM COATED ORAL EVERY 12 HOURS
Refills: 0 | Status: DISCONTINUED | OUTPATIENT
Start: 2019-11-01 | End: 2019-11-03

## 2019-11-01 RX ORDER — CEFTRIAXONE 500 MG/1
1000 INJECTION, POWDER, FOR SOLUTION INTRAMUSCULAR; INTRAVENOUS ONCE
Refills: 0 | Status: COMPLETED | OUTPATIENT
Start: 2019-11-01 | End: 2019-11-01

## 2019-11-01 RX ORDER — FUROSEMIDE 40 MG
40 TABLET ORAL
Refills: 0 | Status: DISCONTINUED | OUTPATIENT
Start: 2019-11-01 | End: 2019-11-01

## 2019-11-01 RX ORDER — LEVOTHYROXINE SODIUM 125 MCG
50 TABLET ORAL DAILY
Refills: 0 | Status: DISCONTINUED | OUTPATIENT
Start: 2019-11-01 | End: 2019-11-03

## 2019-11-01 RX ORDER — HYDROCHLOROTHIAZIDE 25 MG
12.5 TABLET ORAL DAILY
Refills: 0 | Status: DISCONTINUED | OUTPATIENT
Start: 2019-11-01 | End: 2019-11-03

## 2019-11-01 RX ORDER — CLOPIDOGREL BISULFATE 75 MG/1
75 TABLET, FILM COATED ORAL DAILY
Refills: 0 | Status: DISCONTINUED | OUTPATIENT
Start: 2019-11-01 | End: 2019-11-03

## 2019-11-01 RX ORDER — FOLIC ACID 0.8 MG
1 TABLET ORAL DAILY
Refills: 0 | Status: DISCONTINUED | OUTPATIENT
Start: 2019-11-01 | End: 2019-11-03

## 2019-11-01 RX ORDER — AMLODIPINE BESYLATE 2.5 MG/1
5 TABLET ORAL DAILY
Refills: 0 | Status: DISCONTINUED | OUTPATIENT
Start: 2019-11-01 | End: 2019-11-03

## 2019-11-01 RX ORDER — ASPIRIN/CALCIUM CARB/MAGNESIUM 324 MG
81 TABLET ORAL DAILY
Refills: 0 | Status: DISCONTINUED | OUTPATIENT
Start: 2019-11-02 | End: 2019-11-03

## 2019-11-01 RX ORDER — APIXABAN 2.5 MG/1
1 TABLET, FILM COATED ORAL
Qty: 0 | Refills: 0 | DISCHARGE

## 2019-11-01 RX ORDER — FUROSEMIDE 40 MG
40 TABLET ORAL DAILY
Refills: 0 | Status: DISCONTINUED | OUTPATIENT
Start: 2019-11-01 | End: 2019-11-03

## 2019-11-01 RX ORDER — METOPROLOL TARTRATE 50 MG
1 TABLET ORAL
Qty: 0 | Refills: 0 | DISCHARGE

## 2019-11-01 RX ORDER — ASPIRIN/CALCIUM CARB/MAGNESIUM 324 MG
325 TABLET ORAL ONCE
Refills: 0 | Status: COMPLETED | OUTPATIENT
Start: 2019-11-01 | End: 2019-11-01

## 2019-11-01 RX ORDER — FOLIC ACID 0.8 MG
1 TABLET ORAL
Qty: 0 | Refills: 0 | DISCHARGE

## 2019-11-01 RX ORDER — ACETAMINOPHEN 500 MG
650 TABLET ORAL EVERY 6 HOURS
Refills: 0 | Status: DISCONTINUED | OUTPATIENT
Start: 2019-11-01 | End: 2019-11-03

## 2019-11-01 RX ORDER — POTASSIUM CHLORIDE 20 MEQ
40 PACKET (EA) ORAL ONCE
Refills: 0 | Status: COMPLETED | OUTPATIENT
Start: 2019-11-01 | End: 2019-11-01

## 2019-11-01 RX ORDER — METOPROLOL TARTRATE 50 MG
25 TABLET ORAL
Refills: 0 | Status: DISCONTINUED | OUTPATIENT
Start: 2019-11-01 | End: 2019-11-03

## 2019-11-01 RX ORDER — AZITHROMYCIN 500 MG/1
500 TABLET, FILM COATED ORAL ONCE
Refills: 0 | Status: COMPLETED | OUTPATIENT
Start: 2019-11-01 | End: 2019-11-01

## 2019-11-01 RX ORDER — ATORVASTATIN CALCIUM 80 MG/1
10 TABLET, FILM COATED ORAL AT BEDTIME
Refills: 0 | Status: DISCONTINUED | OUTPATIENT
Start: 2019-11-01 | End: 2019-11-03

## 2019-11-01 RX ORDER — LEVOTHYROXINE SODIUM 125 MCG
1 TABLET ORAL
Qty: 0 | Refills: 0 | DISCHARGE

## 2019-11-01 RX ORDER — POTASSIUM CHLORIDE 20 MEQ
10 PACKET (EA) ORAL DAILY
Refills: 0 | Status: DISCONTINUED | OUTPATIENT
Start: 2019-11-01 | End: 2019-11-03

## 2019-11-01 RX ADMIN — AMLODIPINE BESYLATE 5 MILLIGRAM(S): 2.5 TABLET ORAL at 13:57

## 2019-11-01 RX ADMIN — Medication 650 MILLIGRAM(S): at 19:54

## 2019-11-01 RX ADMIN — Medication 25 MILLIGRAM(S): at 17:46

## 2019-11-01 RX ADMIN — AZITHROMYCIN 255 MILLIGRAM(S): 500 TABLET, FILM COATED ORAL at 08:48

## 2019-11-01 RX ADMIN — Medication 1 MILLIGRAM(S): at 13:57

## 2019-11-01 RX ADMIN — Medication 650 MILLIGRAM(S): at 20:45

## 2019-11-01 RX ADMIN — APIXABAN 5 MILLIGRAM(S): 2.5 TABLET, FILM COATED ORAL at 17:46

## 2019-11-01 RX ADMIN — CLOPIDOGREL BISULFATE 75 MILLIGRAM(S): 75 TABLET, FILM COATED ORAL at 14:02

## 2019-11-01 RX ADMIN — Medication 650 MILLIGRAM(S): at 14:46

## 2019-11-01 RX ADMIN — Medication 10 MILLIEQUIVALENT(S): at 13:57

## 2019-11-01 RX ADMIN — CEFTRIAXONE 1000 MILLIGRAM(S): 500 INJECTION, POWDER, FOR SOLUTION INTRAMUSCULAR; INTRAVENOUS at 08:48

## 2019-11-01 RX ADMIN — Medication 40 MILLIGRAM(S): at 09:12

## 2019-11-01 RX ADMIN — ATORVASTATIN CALCIUM 10 MILLIGRAM(S): 80 TABLET, FILM COATED ORAL at 21:24

## 2019-11-01 RX ADMIN — Medication 325 MILLIGRAM(S): at 10:45

## 2019-11-01 RX ADMIN — Medication 650 MILLIGRAM(S): at 13:56

## 2019-11-01 RX ADMIN — CEFTRIAXONE 100 MILLIGRAM(S): 500 INJECTION, POWDER, FOR SOLUTION INTRAMUSCULAR; INTRAVENOUS at 08:08

## 2019-11-01 RX ADMIN — Medication 40 MILLIEQUIVALENT(S): at 05:38

## 2019-11-01 NOTE — H&P ADULT - PROBLEM SELECTOR PLAN 1
Telemetry monitoring.  3 Sets of cardiac enzymes q8hrs.  Aspirin daily  Plavix 75mg po qdaily.  TTE  O2 via NC@2l/min, keep sat>94% at all times.  Cardiology consult-Dr. Daniel  With cardiomegaly, BNP elevation, and presenting symptoms, will start Lasix IVP and follow TTE.

## 2019-11-01 NOTE — CONSULT NOTE ADULT - SUBJECTIVE AND OBJECTIVE BOX
CHIEF COMPLAINT:  Patient is a 79y old  Female who presents with a chief complaint of Chest pain, SOB (01 Nov 2019 08:18)      HPI:  79 year old female with PMH rheumatoid arthritis, HTN, afib on eliquis, RLE stent on plavix, Breast ca presented with shortness of breath with chest pressure.  She states that she has felt fine until last night when she suddenly felt nauseated, "weak all over", and developed substernal chest pain.  Pain radiated up b/l neck and to shoulders.  Pain is both pleuritic and occurs at rest.  She denies palpitations, cough, fever, chills.  Strong history of CAD on father's side (several deaths in 30s). EKG shows Afib, BNP 1878, troponin neg x 1, lactate normal, flu normal, CTA chest shows b/l dependent atelectasis, 1.6 cm groundglass opacity, no PE.    ALLERGIES:  No Known Allergies      Home Medications:  amLODIPine 5 mg oral tablet: 1 tab(s) orally once a day (01 Nov 2019 06:15)  atorvastatin 10 mg oral tablet: 1 tab(s) orally once a day (01 Nov 2019 06:15)  clopidogrel 75 mg oral tablet: 1 tab(s) orally once a day (01 Nov 2019 06:15)  Eliquis 5 mg oral tablet: 1 tab(s) orally 2 times a day (01 Nov 2019 06:15)  folic acid 1 mg oral tablet: 1 tab(s) orally once a day (01 Nov 2019 06:15)  hydroCHLOROthiazide 12.5 mg oral tablet: 1 tab(s) orally once a day (01 Nov 2019 06:15)  levothyroxine 50 mcg (0.05 mg) oral tablet: 1 tab(s) orally once a day (01 Nov 2019 06:15)  methotrexate 2.5 mg oral tablet:  (01 Nov 2019 06:15)  metoprolol tartrate 25 mg oral tablet: 1 tab(s) orally 2 times a day (01 Nov 2019 06:15)    PAST MEDICAL & SURGICAL HISTORY:  A-fib  HTN (hypertension)  H/O synovectomy  H/O breast surgery  History of hip surgery        FAMILY HISTORY:  n/a    SOCIAL HISTORY:    REVIEW OF SYSTEMS:  General:  No wt loss, fevers, chills, night sweats  Eyes:  Good vision, no reported pain  ENT:  No sore throat, pain, runny nose, dysphagia  CV:  No pain, palpitations, hypo/hypertension  Resp:  No dyspnea, cough, tachypnea, wheezing  GI:  No pain, nausea, vomiting, diarrhea, constipation  :  No pain, bleeding, incontinence, nocturia  Muscle:  No pain, weakness  Breast:  No pain, abscess, mass, discharge  Neuro:  No weakness, tingling, memory problems  Psych:  No fatigue, insomnia, mood problems, depression  Endocrine:  No polyuria, polydipsia, cold/heat intolerance  Heme:  No petechiae, ecchymosis, easy bruisability  Skin:  No rash, edema    PHYSICAL EXAM:  Vital Signs:  Vital Signs Last 24 Hrs  T(C): 36.2 (01 Nov 2019 13:10), Max: 37.6 (01 Nov 2019 09:22)  T(F): 97.2 (01 Nov 2019 13:10), Max: 99.6 (01 Nov 2019 09:22)  HR: 85 (01 Nov 2019 13:10) (85 - 103)  BP: 134/65 (01 Nov 2019 13:10) (99/77 - 146/65)  RR: 16 (01 Nov 2019 13:10) (16 - 19)  SpO2: 96% (01 Nov 2019 13:10) (96% - 100%)    Tele:     Constitutional: well developed, normal appearance, well groomed, well nourished, no deformities and no acute distress.   Eyes: the conjunctiva exhibited no abnormalities and the eyelids demonstrated no xanthelasmas.   HEENT: normal oral mucosa, no oral pallor and no oral cyanosis.   Neck: normal jugular venous A waves present, normal jugular venous V waves present and no jugular venous fine A waves.   Pulmonary: no respiratory distress, normal respiratory rhythm and effort, no accessory muscle use and lungs were clear to auscultation bilaterally.   Cardiovascular: heart rate and rhythm were normal, normal S1 and S2 and no murmur, gallop, rub, heave or thrill are present.   Abdomen: soft, non-tender, no hepato-splenomegaly and no abdominal mass palpated.   Musculoskeletal: the gait could not be assessed..   Extremities: no clubbing of the fingernails, no localized cyanosis, no petechial hemorrhages and no ischemic changes.   Skin: normal skin color and pigmentation, no rash, no venous stasis, no skin lesions, no skin ulcer and no xanthoma was observed.   Psychiatric: oriented to person, place, and time, the affect was normal, the mood was normal and not feeling anxious.      LABORATORY:                          11.9   13.40 )-----------( 297      ( 01 Nov 2019 04:47 )             35.2     11-01    137  |  100  |  21  ----------------------------<  113<H>  3.1<L>   |  29  |  1.15    Ca    9.3      01 Nov 2019 04:47    TPro  7.4  /  Alb  3.7  /  TBili  0.6  /  DBili  x   /  AST  17  /  ALT  19  /  AlkPhos  86  11-01    ABG - ( 01 Nov 2019 08:01 )  pH, Arterial: x     pH, Blood: 7.48  /  pCO2: 36    /  pO2: 82    / HCO3: 27    / Base Excess: 3.8   /  SaO2: 96            CARDIAC MARKERS ( 01 Nov 2019 13:43 )  <.015 ng/mL / x     / 100 U/L / x     / <1.0 ng/mL  CARDIAC MARKERS ( 01 Nov 2019 04:47 )  <.015 ng/mL / x     / x     / x     / x          LIVER FUNCTIONS - ( 01 Nov 2019 04:47 )  Alb: 3.7 g/dL / Pro: 7.4 gm/dL / ALK PHOS: 86 U/L / ALT: 19 U/L / AST: 17 U/L / GGT: x           PT/INR - ( 01 Nov 2019 04:47 )   PT: 13.9 sec;   INR: 1.23 ratio         PTT - ( 01 Nov 2019 04:47 )  PTT:31.2 sec    BNPSerum Pro-Brain Natriuretic Peptide: 1878 pg/mL (11-01-19 @ 08:11)      IMAGING:  < from: CT Angio Chest w/ IV Cont (11.01.19 @ 06:37) >  No evidence of pulmonary embolism.  There is a 1.6 cm groundglass nodule in the right upper lobe. This may be   followed up with chest CT in 4-6 weeks.  Cardiomegaly.    < end of copied text >      ASSESSMENT:  79 year old female with PMH rheumatoid arthritis, HTN, afib on eliquis, RLE stent on plavix, Breast ca presented with shortness of breath with chest pressure.  She states that she has felt fine until last night when she suddenly felt nauseated, "weak all over", and developed substernal chest pain.  Pain radiated up b/l neck and to shoulders.  Pain is both pleuritic and occurs at rest.  She denies palpitations, cough, fever, chills.  Strong history of CAD on father's side (several deaths in 30s). EKG shows Afib, BNP 1878, troponin neg x 1, lactate normal, flu normal, CTA chest shows b/l dependent atelectasis, 1.6 cm groundglass opacity, no PE. Finding of cardiomegaly?    PLAN:     Tele monitoring.    MEDICATIONS  (STANDING):  amLODIPine   Tablet 5 milliGRAM(s) Oral daily  apixaban 5 milliGRAM(s) Oral every 12 hours  atorvastatin 10 milliGRAM(s) Oral at bedtime  clopidogrel Tablet 75 milliGRAM(s) Oral daily  folic acid 1 milliGRAM(s) Oral daily  furosemide   Injectable 40 milliGRAM(s) IV Push daily  hydrochlorothiazide 12.5 milliGRAM(s) Oral daily  levothyroxine 50 MICROGram(s) Oral daily  metoprolol tartrate 25 milliGRAM(s) Oral two times a day  potassium chloride    Tablet ER 10 milliEquivalent(s) Oral daily    Element of acute HF unclear type. To be clarified with echo.  Keep outs more than inputs.  fluid/salt restriction.  f/u labs.    Rian Daniel MD, FACC, KEYONA, LILLINC, FACP  Director, Heart Failure Services  [facility]  , Department of Cardiology  [facility] CHIEF COMPLAINT:  Patient is a 79y old  Female who presents with a chief complaint of Chest pain, SOB (01 Nov 2019 08:18)      HPI:  79 year old female with rheumatoid arthritis, HTN, afib on eliquis, RLE stent on plavix, Breast ca presented with shortness of breath with chest pressure.  She states that she has felt fine until last night when she suddenly felt nauseated, "weak all over", and developed substernal chest pain.  Pain radiated up b/l neck and to shoulders.  Pain is both pleuritic and occurs at rest.  She denies palpitations, cough, fever, chills.  Strong history of CAD on father's side (several deaths in 30s). EKG shows Afib, BNP 1878, troponin neg x 1, lactate normal, flu normal, CTA chest shows b/l dependent atelectasis, 1.6 cm groundglass opacity, no PE.    ALLERGIES:  No Known Allergies      Home Medications:  amLODIPine 5 mg oral tablet: 1 tab(s) orally once a day (01 Nov 2019 06:15)  atorvastatin 10 mg oral tablet: 1 tab(s) orally once a day (01 Nov 2019 06:15)  clopidogrel 75 mg oral tablet: 1 tab(s) orally once a day (01 Nov 2019 06:15)  Eliquis 5 mg oral tablet: 1 tab(s) orally 2 times a day (01 Nov 2019 06:15)  folic acid 1 mg oral tablet: 1 tab(s) orally once a day (01 Nov 2019 06:15)  hydroCHLOROthiazide 12.5 mg oral tablet: 1 tab(s) orally once a day (01 Nov 2019 06:15)  levothyroxine 50 mcg (0.05 mg) oral tablet: 1 tab(s) orally once a day (01 Nov 2019 06:15)  methotrexate 2.5 mg oral tablet:  (01 Nov 2019 06:15)  metoprolol tartrate 25 mg oral tablet: 1 tab(s) orally 2 times a day (01 Nov 2019 06:15)    PAST MEDICAL & SURGICAL HISTORY:  A-fib  HTN (hypertension)  H/O synovectomy  H/O breast surgery  History of hip surgery        FAMILY HISTORY:  n/a    SOCIAL HISTORY:    REVIEW OF SYSTEMS:  General:  No wt loss, fevers, chills, night sweats  Eyes:  Good vision, no reported pain  ENT:  No sore throat, pain, runny nose, dysphagia  CV:  No pain, palpitations, hypo/hypertension  Resp:  No dyspnea, cough, tachypnea, wheezing  GI:  No pain, nausea, vomiting, diarrhea, constipation  :  No pain, bleeding, incontinence, nocturia  Muscle:  No pain, weakness  Breast:  No pain, abscess, mass, discharge  Neuro:  No weakness, tingling, memory problems  Psych:  No fatigue, insomnia, mood problems, depression  Endocrine:  No polyuria, polydipsia, cold/heat intolerance  Heme:  No petechiae, ecchymosis, easy bruisability  Skin:  No rash, edema    PHYSICAL EXAM:  Vital Signs:  Vital Signs Last 24 Hrs  T(C): 36.2 (01 Nov 2019 13:10), Max: 37.6 (01 Nov 2019 09:22)  T(F): 97.2 (01 Nov 2019 13:10), Max: 99.6 (01 Nov 2019 09:22)  HR: 85 (01 Nov 2019 13:10) (85 - 103)  BP: 134/65 (01 Nov 2019 13:10) (99/77 - 146/65)  RR: 16 (01 Nov 2019 13:10) (16 - 19)  SpO2: 96% (01 Nov 2019 13:10) (96% - 100%)    Tele: AFib    Constitutional: well developed, normal appearance, well groomed, well nourished, no deformities and no acute distress.   Eyes: the conjunctiva exhibited no abnormalities and the eyelids demonstrated no xanthelasmas.   HEENT: normal oral mucosa, no oral pallor and no oral cyanosis.   Neck: normal jugular venous A waves present, normal jugular venous V waves present and no jugular venous fine A waves.   Pulmonary: no respiratory distress, normal respiratory rhythm and effort, no accessory muscle use and lungs were clear to auscultation bilaterally.   Cardiovascular: heart rate and rhythm were normal, normal S1 and S2 and no murmur, gallop, rub, heave or thrill are present.   Abdomen: soft, non-tender, no hepato-splenomegaly and no abdominal mass palpated.   Musculoskeletal: the gait could not be assessed.   Extremities: no clubbing of the fingernails, no localized cyanosis, no petechial hemorrhages and no ischemic changes.   Skin: normal skin color and pigmentation, no rash, no venous stasis, no skin lesions, no skin ulcer and no xanthoma was observed.   Psychiatric: oriented to person, place, and time, the affect was normal, the mood was normal and not feeling anxious.      LABORATORY:                          11.9   13.40 )-----------( 297      ( 01 Nov 2019 04:47 )             35.2     11-01    137  |  100  |  21  ----------------------------<  113<H>  3.1<L>   |  29  |  1.15    Ca    9.3      01 Nov 2019 04:47    TPro  7.4  /  Alb  3.7  /  TBili  0.6  /  DBili  x   /  AST  17  /  ALT  19  /  AlkPhos  86  11-01    ABG - ( 01 Nov 2019 08:01 )  pH, Arterial: x     pH, Blood: 7.48  /  pCO2: 36    /  pO2: 82    / HCO3: 27    / Base Excess: 3.8   /  SaO2: 96            CARDIAC MARKERS ( 01 Nov 2019 13:43 )  <.015 ng/mL / x     / 100 U/L / x     / <1.0 ng/mL  CARDIAC MARKERS ( 01 Nov 2019 04:47 )  <.015 ng/mL / x     / x     / x     / x          LIVER FUNCTIONS - ( 01 Nov 2019 04:47 )  Alb: 3.7 g/dL / Pro: 7.4 gm/dL / ALK PHOS: 86 U/L / ALT: 19 U/L / AST: 17 U/L / GGT: x           PT/INR - ( 01 Nov 2019 04:47 )   PT: 13.9 sec;   INR: 1.23 ratio         PTT - ( 01 Nov 2019 04:47 )  PTT:31.2 sec    BNPSerum Pro-Brain Natriuretic Peptide: 1878 pg/mL (11-01-19 @ 08:11)      IMAGING:  < from: CT Angio Chest w/ IV Cont (11.01.19 @ 06:37) >  No evidence of pulmonary embolism.  There is a 1.6 cm groundglass nodule in the right upper lobe. This may be   followed up with chest CT in 4-6 weeks.  Cardiomegaly.    < end of copied text >      ASSESSMENT:  79 year old female with PMH rheumatoid arthritis, HTN, afib on eliquis, RLE stent on plavix, Breast ca presented with shortness of breath with chest pressure.  She states that she has felt fine until last night when she suddenly felt nauseated, "weak all over", and developed substernal chest pain.  Pain radiated up b/l neck and to shoulders.  Pain is both pleuritic and occurs at rest.  She denies palpitations, cough, fever, chills.  Strong history of CAD on father's side (several deaths in 30s). EKG shows Afib, BNP 1878, troponin neg x 1, lactate normal, flu normal, CTA chest shows b/l dependent atelectasis, 1.6 cm groundglass opacity, no PE. Finding of cardiomegaly?    PLAN:     Tele monitoring.    MEDICATIONS  (STANDING):  amLODIPine   Tablet 5 milliGRAM(s) Oral daily  apixaban 5 milliGRAM(s) Oral every 12 hours  atorvastatin 10 milliGRAM(s) Oral at bedtime  clopidogrel Tablet 75 milliGRAM(s) Oral daily  folic acid 1 milliGRAM(s) Oral daily  furosemide   Injectable 40 milliGRAM(s) IV Push daily  hydrochlorothiazide 12.5 milliGRAM(s) Oral daily  levothyroxine 50 MICROGram(s) Oral daily  metoprolol tartrate 25 milliGRAM(s) Oral two times a day  potassium chloride    Tablet ER 10 milliEquivalent(s) Oral daily    Element of acute HF unclear type. To be clarified with echo.  Keep outs more than inputs.  fluid/salt restriction.  f/u labs.  K supplement.    Rian Daniel MD, FACC, FASTRAVIS, FASNC, FACP  Director, Heart Failure Services  [facility]  , Department of Cardiology  [facility] CHIEF COMPLAINT:  Patient is a 79y old  Female who presents with a chief complaint of Chest pain, SOB (01 Nov 2019 08:18)      HPI:  79 year old female with rheumatoid arthritis, HTN, afib on eliquis, RLE stent on plavix, Breast ca presented with shortness of breath with chest pressure.  She states that she has felt fine until last night when she suddenly felt nauseated, "weak all over", and developed substernal chest pain.  Pain radiated up b/l neck and to shoulders.  Pain is both pleuritic and occurs at rest.  She denies palpitations, cough, fever, chills.  Strong history of CAD on father's side (several deaths in 30s). EKG shows Afib, BNP 1878, troponin neg x 1, lactate normal, flu normal, CTA chest shows b/l dependent atelectasis, 1.6 cm groundglass opacity, no PE.    ALLERGIES:  No Known Allergies      Home Medications:  amLODIPine 5 mg oral tablet: 1 tab(s) orally once a day (01 Nov 2019 06:15)  atorvastatin 10 mg oral tablet: 1 tab(s) orally once a day (01 Nov 2019 06:15)  clopidogrel 75 mg oral tablet: 1 tab(s) orally once a day (01 Nov 2019 06:15)  Eliquis 5 mg oral tablet: 1 tab(s) orally 2 times a day (01 Nov 2019 06:15)  folic acid 1 mg oral tablet: 1 tab(s) orally once a day (01 Nov 2019 06:15)  hydroCHLOROthiazide 12.5 mg oral tablet: 1 tab(s) orally once a day (01 Nov 2019 06:15)  levothyroxine 50 mcg (0.05 mg) oral tablet: 1 tab(s) orally once a day (01 Nov 2019 06:15)  methotrexate 2.5 mg oral tablet:  (01 Nov 2019 06:15)  metoprolol tartrate 25 mg oral tablet: 1 tab(s) orally 2 times a day (01 Nov 2019 06:15)    PAST MEDICAL & SURGICAL HISTORY:  A-fib  HTN (hypertension)  H/O synovectomy  H/O breast surgery  History of hip surgery        FAMILY HISTORY:  n/a    SOCIAL HISTORY:    REVIEW OF SYSTEMS:  General:  No wt loss, fevers, chills, night sweats  Eyes:  Good vision, no reported pain  ENT:  No sore throat, pain, runny nose, dysphagia  CV:  No pain, palpitations, hypo/hypertension  Resp:  No dyspnea, cough, tachypnea, wheezing  GI:  No pain, nausea, vomiting, diarrhea, constipation  :  No pain, bleeding, incontinence, nocturia  Muscle:  No pain, weakness  Breast:  No pain, abscess, mass, discharge  Neuro:  No weakness, tingling, memory problems  Psych:  No fatigue, insomnia, mood problems, depression  Endocrine:  No polyuria, polydipsia, cold/heat intolerance  Heme:  No petechiae, ecchymosis, easy bruisability  Skin:  No rash, edema    PHYSICAL EXAM:  Vital Signs:  Vital Signs Last 24 Hrs  T(C): 36.2 (01 Nov 2019 13:10), Max: 37.6 (01 Nov 2019 09:22)  T(F): 97.2 (01 Nov 2019 13:10), Max: 99.6 (01 Nov 2019 09:22)  HR: 85 (01 Nov 2019 13:10) (85 - 103)  BP: 134/65 (01 Nov 2019 13:10) (99/77 - 146/65)  RR: 16 (01 Nov 2019 13:10) (16 - 19)  SpO2: 96% (01 Nov 2019 13:10) (96% - 100%)    Tele: AFib    Constitutional: well developed, normal appearance, well groomed, well nourished, no deformities and no acute distress.   Eyes: the conjunctiva exhibited no abnormalities and the eyelids demonstrated no xanthelasmas.   HEENT: normal oral mucosa, no oral pallor and no oral cyanosis.   Neck: normal jugular venous A waves present, normal jugular venous V waves present and no jugular venous fine A waves.   Pulmonary: no respiratory distress, normal respiratory rhythm and effort, no accessory muscle use and lungs were clear to auscultation bilaterally.   Cardiovascular: heart rate and rhythm were normal, normal S1 and S2 and no murmur, gallop, rub, heave or thrill are present.   Abdomen: soft, non-tender, no hepato-splenomegaly and no abdominal mass palpated.   Musculoskeletal: the gait could not be assessed.   Extremities: no clubbing of the fingernails, no localized cyanosis, no petechial hemorrhages and no ischemic changes. +1 b/l lower leg edema.  Skin: normal skin color and pigmentation, no rash, no venous stasis, no skin lesions, no skin ulcer and no xanthoma was observed.   Psychiatric: oriented to person, place, and time, the affect was normal, the mood was normal and not feeling anxious.      LABORATORY:                          11.9   13.40 )-----------( 297      ( 01 Nov 2019 04:47 )             35.2     11-01    137  |  100  |  21  ----------------------------<  113<H>  3.1<L>   |  29  |  1.15    Ca    9.3      01 Nov 2019 04:47    TPro  7.4  /  Alb  3.7  /  TBili  0.6  /  DBili  x   /  AST  17  /  ALT  19  /  AlkPhos  86  11-01    ABG - ( 01 Nov 2019 08:01 )  pH, Arterial: x     pH, Blood: 7.48  /  pCO2: 36    /  pO2: 82    / HCO3: 27    / Base Excess: 3.8   /  SaO2: 96            CARDIAC MARKERS ( 01 Nov 2019 13:43 )  <.015 ng/mL / x     / 100 U/L / x     / <1.0 ng/mL  CARDIAC MARKERS ( 01 Nov 2019 04:47 )  <.015 ng/mL / x     / x     / x     / x          LIVER FUNCTIONS - ( 01 Nov 2019 04:47 )  Alb: 3.7 g/dL / Pro: 7.4 gm/dL / ALK PHOS: 86 U/L / ALT: 19 U/L / AST: 17 U/L / GGT: x           PT/INR - ( 01 Nov 2019 04:47 )   PT: 13.9 sec;   INR: 1.23 ratio         PTT - ( 01 Nov 2019 04:47 )  PTT:31.2 sec    BNPSerum Pro-Brain Natriuretic Peptide: 1878 pg/mL (11-01-19 @ 08:11)      IMAGING:  < from: CT Angio Chest w/ IV Cont (11.01.19 @ 06:37) >  No evidence of pulmonary embolism.  There is a 1.6 cm groundglass nodule in the right upper lobe. This may be   followed up with chest CT in 4-6 weeks.  Cardiomegaly.    < end of copied text >      ASSESSMENT:  79 year old female with PMH rheumatoid arthritis, HTN, afib on eliquis, RLE stent on plavix, Breast ca presented with shortness of breath with chest pressure.  She states that she has felt fine until last night when she suddenly felt nauseated, "weak all over", and developed substernal chest pain.  Pain radiated up b/l neck and to shoulders.  Pain is both pleuritic and occurs at rest.  She denies palpitations, cough, fever, chills.  Strong history of CAD on father's side (several deaths in 30s). EKG shows Afib, BNP 1878, troponin neg x 1, lactate normal, flu normal, CTA chest shows b/l dependent atelectasis, 1.6 cm groundglass opacity, no PE. Finding of cardiomegaly?    PLAN:     Tele monitoring.    MEDICATIONS  (STANDING):  amLODIPine   Tablet 5 milliGRAM(s) Oral daily  apixaban 5 milliGRAM(s) Oral every 12 hours  atorvastatin 10 milliGRAM(s) Oral at bedtime  clopidogrel Tablet 75 milliGRAM(s) Oral daily  folic acid 1 milliGRAM(s) Oral daily  furosemide   Injectable 40 milliGRAM(s) IV Push daily  hydrochlorothiazide 12.5 milliGRAM(s) Oral daily  levothyroxine 50 MICROGram(s) Oral daily  metoprolol tartrate 25 milliGRAM(s) Oral two times a day  potassium chloride    Tablet ER 10 milliEquivalent(s) Oral daily    Element of acute HF unclear type. To be clarified with echo.  Keep outs more than inputs.  fluid/salt restriction.  f/u labs.  K supplement.    Rian Daniel MD, FACC, FASE, FASNC, FACP  Director, Heart Failure Services  [facility]  , Department of Cardiology  [facility]

## 2019-11-01 NOTE — ED ADULT NURSE NOTE - OBJECTIVE STATEMENT
pt presents to the ED c/o of left side chest pain described as chest tightness started at 2am and pt states took tums but found no relief. pt states " pain got worse and spread across her chest to shoulders and back and upper extremities numbness . pt on blood thinners. hx of stent placement. pt presents to the ED c/o of left side chest pain described as chest tightness associated with SOB started at 2am. pt states took tums but found no relief. pt states " pain got worse and spread across her chest to shoulders and back and b/l upper extremities numbness . pt on blood thinners. hx of stent placement and breast cancer. pt presents with pitting edema of b/l lower extremities pt presents to the ED c/o of left side chest pain described as chest tightness associated with SOB started at 2am. pt states took tums but found no relief. pt states " pain got worse and spread across her chest to shoulders and back and b/l upper extremities numbness . pt on blood thinners. hx of right leg stent placement and breast cancer. pt presents with pitting edema of b/l lower extremities

## 2019-11-01 NOTE — H&P ADULT - NSHPREVIEWOFSYSTEMS_GEN_ALL_CORE
No fever/chills, No photophobia/eye pain/changes in vision,  No palpitations, No abdominal pain, No N/V/D, no dysuria/frequency/discharge, no rash, no changes in neurological status/function.

## 2019-11-01 NOTE — H&P ADULT - NSHPLABSRESULTS_GEN_ALL_CORE
Vital Signs Last 24 Hrs  T(C): 37.2 (01 Nov 2019 07:18), Max: 37.2 (01 Nov 2019 07:18)  T(F): 99 (01 Nov 2019 07:18), Max: 99 (01 Nov 2019 07:18)  HR: 101 (01 Nov 2019 09:22) (89 - 103)  BP: 138/76 (01 Nov 2019 09:22) (99/77 - 146/65)  BP(mean): --  RR: 19 (01 Nov 2019 09:22) (17 - 19)  SpO2: 96% (01 Nov 2019 09:22) (96% - 100%)      LABS:                        11.9   13.40 )-----------( 297      ( 01 Nov 2019 04:47 )             35.2     11-01    137  |  100  |  21  ----------------------------<  113<H>  3.1<L>   |  29  |  1.15    Ca    9.3      01 Nov 2019 04:47    TPro  7.4  /  Alb  3.7  /  TBili  0.6  /  DBili  x   /  AST  17  /  ALT  19  /  AlkPhos  86  11-01    PT/INR - ( 01 Nov 2019 04:47 )   PT: 13.9 sec;   INR: 1.23 ratio         PTT - ( 01 Nov 2019 04:47 )  PTT:31.2 sec

## 2019-11-01 NOTE — H&P ADULT - ASSESSMENT
79 year old female with PMH rheumatoid arthritis, HTN, afib on eliquis, RLE stent on plavix, Breast ca presents to ED for evaluation of shortness of breath since 2:30AM with chest pressure.  Patient will require admission for at least 2 midnights as detailed below:    IMPROVE VTE Individual Risk Assessment          RISK                                                          Points    [  ] Previous VTE                                                3    [  ] Thrombophilia                                             2    [  ] Lower limb paralysis                                    2        (unable to hold up >15 seconds)      [  ] Current Cancer                                             2         (within 6 months)    [ x ] Immobilization > 24 hrs                              1    [  ] ICU/CCU stay > 24 hours                            1    [ x ] Age > 60                                                    1    IMPROVE VTE Score ___2______  On Eliquis for Afib

## 2019-11-01 NOTE — H&P ADULT - HISTORY OF PRESENT ILLNESS
Pt to be admitted for chest pain, SOB, possible Pneumonia, in progress. 79 year old female with PMH rheumatoid arthritis, HTN, afib on eliquis, RLE stent on plavix, Breast ca presents to ED for evaluation of shortness of breath since 2:30AM with chest pressure.  She states that she has felt fine until last night when she suddenly felt nauseated, "weak all over", and developed substernal chest pain.  Pain radiated up b/l neck and to shoulders.  Pain is both pleuritic and occurs at rest. 79 year old female with PMH rheumatoid arthritis, HTN, afib on eliquis, RLE stent on plavix, Breast ca presents to ED for evaluation of shortness of breath since 2:30AM with chest pressure.  She states that she has felt fine until last night when she suddenly felt nauseated, "weak all over", and developed substernal chest pain.  Pain radiated up b/l neck and to shoulders.  Pain is both pleuritic and occurs at rest.  She denies palpitations, cough, fever, chills.  Strong history of CAD on father's side (several deaths in 30s).    In the ED, EKG shows Afib, BNP 1878, troponin neg x 1, lactate normal, flu normal, CTA chest shows b/l dependent atelectasis, 1.6 cm groundglass opacity, no PE.

## 2019-11-01 NOTE — H&P ADULT - NSHPPHYSICALEXAM_GEN_ALL_CORE
Normal for race GENERAL: NAD, well-groomed, well-developed  HEAD:  Atraumatic, Normocephalic  EYES: EOMI, PERRLA, conjunctiva and sclera clear  ENMT: No tonsillar erythema, exudates, or enlargement; Moist mucous membranes, No lesions  NECK: Supple, No JVD, Normal thyroid  NERVOUS SYSTEM:  Alert & Oriented X3, Good concentration  CHEST/LUNG: Clear to percussion bilaterally; No rales, rhonchi, wheezing, or rubs  HEART: Regular rate and rhythm; No murmurs, rubs, or gallops  ABDOMEN: Soft, Nontender, Nondistended; Bowel sounds present  EXTREMITIES: no clubbing, cyanosis, or edema  SKIN: no rashes or lesions  PSYCH: normal affect and behavior

## 2019-11-01 NOTE — ED ADULT NURSE REASSESSMENT NOTE - NS ED NURSE REASSESS COMMENT FT1
Pt admitted to tele report given to hold RN for f/u care Abx  treatment in progress awaiting for bed

## 2019-11-01 NOTE — ED PROVIDER NOTE - OBJECTIVE STATEMENT
78 yo female with PMH rheumatoid arthritis, HTN, afib on eliquis, RLE stent on plavix, Breast ca presents to ED for evaluation of shortness of breath since 2:30AM with chest pressure. Chest pressure is midsternal, non radiating, exertional but also at rest. Denies fevers, chills, cough, URI symptoms. Denies h/o recent travel, immobilization, surgery, OCP use. Denies abd pain, nausea, vomiting, diarrhea, lightheadedness, diaphoresis.

## 2019-11-01 NOTE — ED PROVIDER NOTE - NS ED ROS FT
Constitutional: no fevers or chills  Cardiac: no palpitations, + chest pain  Lungs: + shortness of breath, denies wheezes  Abd: no abd pain, nausea, vomiting, diarrhea  Genitourinary: no dysuria, increased urinary frequency, hematuria  Neurology: no sensorimotor deficits, no dizziness, no headache, no visual changes  Skin: no rashes  All other ROS negative except as per HPI

## 2019-11-01 NOTE — ED PROVIDER NOTE - PHYSICAL EXAMINATION
Gen: no acute distress, well appearing, awake, alert and oriented x 3  Cardiac: regular rate and rhythm, +S1S2  Pulm: Clear to auscultation bilaterally, short of breath while talking  Abd: soft, nontender, nondistended, no guarding  Back: neg CVA ttp, nontender spine  Extremity: no edema, no deformity, warm and well perfused, FROM all extremities    Neuro: awake, alert, oriented x 3, sensorimotor intact

## 2019-11-01 NOTE — ED ADULT NURSE NOTE - NS ED PATIENT SAFETY CONCERN
----- Message from Shanon Kingston DO sent at 1/7/2019  8:31 AM CST -----  Please notify patient of negative urine culture.    Please notify patient when we get her gonorrhea and chlamydia results we will notify her.  These results are still pending.   No

## 2019-11-02 DIAGNOSIS — R73.03 PREDIABETES: ICD-10-CM

## 2019-11-02 DIAGNOSIS — I27.20 PULMONARY HYPERTENSION, UNSPECIFIED: ICD-10-CM

## 2019-11-02 LAB
ANION GAP SERPL CALC-SCNC: 6 MMOL/L — SIGNIFICANT CHANGE UP (ref 5–17)
BASOPHILS # BLD AUTO: 0.05 K/UL — SIGNIFICANT CHANGE UP (ref 0–0.2)
BASOPHILS NFR BLD AUTO: 0.4 % — SIGNIFICANT CHANGE UP (ref 0–2)
BUN SERPL-MCNC: 16 MG/DL — SIGNIFICANT CHANGE UP (ref 7–23)
CALCIUM SERPL-MCNC: 8.7 MG/DL — SIGNIFICANT CHANGE UP (ref 8.5–10.1)
CHLORIDE SERPL-SCNC: 99 MMOL/L — SIGNIFICANT CHANGE UP (ref 96–108)
CHOLEST SERPL-MCNC: 119 MG/DL — SIGNIFICANT CHANGE UP (ref 10–199)
CO2 SERPL-SCNC: 33 MMOL/L — HIGH (ref 22–31)
CREAT SERPL-MCNC: 1.2 MG/DL — SIGNIFICANT CHANGE UP (ref 0.5–1.3)
EOSINOPHIL # BLD AUTO: 0.15 K/UL — SIGNIFICANT CHANGE UP (ref 0–0.5)
EOSINOPHIL NFR BLD AUTO: 1.3 % — SIGNIFICANT CHANGE UP (ref 0–6)
GLUCOSE SERPL-MCNC: 101 MG/DL — HIGH (ref 70–99)
HBA1C BLD-MCNC: 5.8 % — HIGH (ref 4–5.6)
HCT VFR BLD CALC: 32.1 % — LOW (ref 34.5–45)
HDLC SERPL-MCNC: 61 MG/DL — SIGNIFICANT CHANGE UP
HGB BLD-MCNC: 10.7 G/DL — LOW (ref 11.5–15.5)
IMM GRANULOCYTES NFR BLD AUTO: 0.7 % — SIGNIFICANT CHANGE UP (ref 0–1.5)
LIPID PNL WITH DIRECT LDL SERPL: 47 MG/DL — SIGNIFICANT CHANGE UP
LYMPHOCYTES # BLD AUTO: 1.02 K/UL — SIGNIFICANT CHANGE UP (ref 1–3.3)
LYMPHOCYTES # BLD AUTO: 9 % — LOW (ref 13–44)
MCHC RBC-ENTMCNC: 32.7 PG — SIGNIFICANT CHANGE UP (ref 27–34)
MCHC RBC-ENTMCNC: 33.3 GM/DL — SIGNIFICANT CHANGE UP (ref 32–36)
MCV RBC AUTO: 98.2 FL — SIGNIFICANT CHANGE UP (ref 80–100)
MONOCYTES # BLD AUTO: 1.49 K/UL — HIGH (ref 0–0.9)
MONOCYTES NFR BLD AUTO: 13.2 % — SIGNIFICANT CHANGE UP (ref 2–14)
NEUTROPHILS # BLD AUTO: 8.5 K/UL — HIGH (ref 1.8–7.4)
NEUTROPHILS NFR BLD AUTO: 75.4 % — SIGNIFICANT CHANGE UP (ref 43–77)
NRBC # BLD: 0 /100 WBCS — SIGNIFICANT CHANGE UP (ref 0–0)
PLATELET # BLD AUTO: 240 K/UL — SIGNIFICANT CHANGE UP (ref 150–400)
POTASSIUM SERPL-MCNC: 3.1 MMOL/L — LOW (ref 3.5–5.3)
POTASSIUM SERPL-SCNC: 3.1 MMOL/L — LOW (ref 3.5–5.3)
RBC # BLD: 3.27 M/UL — LOW (ref 3.8–5.2)
RBC # FLD: 15.8 % — HIGH (ref 10.3–14.5)
SODIUM SERPL-SCNC: 138 MMOL/L — SIGNIFICANT CHANGE UP (ref 135–145)
TOTAL CHOLESTEROL/HDL RATIO MEASUREMENT: 2 RATIO — LOW (ref 3.3–7.1)
TRIGL SERPL-MCNC: 55 MG/DL — SIGNIFICANT CHANGE UP (ref 10–149)
WBC # BLD: 11.29 K/UL — HIGH (ref 3.8–10.5)
WBC # FLD AUTO: 11.29 K/UL — HIGH (ref 3.8–10.5)

## 2019-11-02 PROCEDURE — 99233 SBSQ HOSP IP/OBS HIGH 50: CPT

## 2019-11-02 RX ORDER — POTASSIUM CHLORIDE 20 MEQ
40 PACKET (EA) ORAL ONCE
Refills: 0 | Status: COMPLETED | OUTPATIENT
Start: 2019-11-02 | End: 2019-11-02

## 2019-11-02 RX ORDER — METHOTREXATE 2.5 MG/1
15 TABLET ORAL
Refills: 0 | Status: DISCONTINUED | OUTPATIENT
Start: 2019-11-03 | End: 2019-11-03

## 2019-11-02 RX ADMIN — Medication 1 MILLIGRAM(S): at 11:25

## 2019-11-02 RX ADMIN — APIXABAN 5 MILLIGRAM(S): 2.5 TABLET, FILM COATED ORAL at 17:14

## 2019-11-02 RX ADMIN — Medication 10 MILLIEQUIVALENT(S): at 11:25

## 2019-11-02 RX ADMIN — Medication 50 MICROGRAM(S): at 05:29

## 2019-11-02 RX ADMIN — Medication 40 MILLIGRAM(S): at 05:29

## 2019-11-02 RX ADMIN — APIXABAN 5 MILLIGRAM(S): 2.5 TABLET, FILM COATED ORAL at 05:29

## 2019-11-02 RX ADMIN — CLOPIDOGREL BISULFATE 75 MILLIGRAM(S): 75 TABLET, FILM COATED ORAL at 11:25

## 2019-11-02 RX ADMIN — ATORVASTATIN CALCIUM 10 MILLIGRAM(S): 80 TABLET, FILM COATED ORAL at 21:15

## 2019-11-02 RX ADMIN — Medication 40 MILLIEQUIVALENT(S): at 10:08

## 2019-11-02 RX ADMIN — Medication 81 MILLIGRAM(S): at 11:25

## 2019-11-02 RX ADMIN — Medication 10 MILLIGRAM(S): at 17:39

## 2019-11-02 NOTE — PROGRESS NOTE ADULT - PROBLEM SELECTOR PROBLEM 7
Rheumatoid arthritis involving multiple sites, unspecified rheumatoid factor presence Mixed hyperlipidemia

## 2019-11-02 NOTE — CONSULT NOTE ADULT - SUBJECTIVE AND OBJECTIVE BOX
Patient is a 79y old  Female who presents with a chief complaint of Chest pain, SOB (2019 14:26)    HPI:  79 year old female with Rheumatoid arthritis, HTN, A fib on Eliquis,  RLE stent(for PAD) on Plavix, Leg swelling(was on diuretics which she stoped), Right  Breast CA S/P Lumpectomy +Leidy dissection followed by Chem and radiation about 15 years ago.  Woke up from sleep with mid sternal  chest pain(pressure like), had radiation to bilateral lower chest,  up to neck and associated with SOB. Reports with pleuritic component, had episode of vomiting after arrival but denies sweating  No URI, fever, chills, cough or sputum production.  In the ED, EKG shows Afib, BNP 1878, troponin neg x 1, lactate normal, flu normal, CTA chest shows b/l dependent atelectasis, 1.6 cm ground-glass opacity, no PE.  75 pack year smoking history , quit 29 years ago.    PAST MEDICAL & SURGICAL HISTORY:  A-fib  HTN (hypertension)  H/O synovectomy  H/O breast surgery  History of hip surgery    FAMILY HISTORY: Strong cardiac history.    SOCIAL HISTORY:  ex smoker    Allergies  No Known Allergies    MEDICATIONS  (STANDING):  amLODIPine   Tablet 5 milliGRAM(s) Oral daily  apixaban 5 milliGRAM(s) Oral every 12 hours  aspirin enteric coated 81 milliGRAM(s) Oral daily  atorvastatin 10 milliGRAM(s) Oral at bedtime  clopidogrel Tablet 75 milliGRAM(s) Oral daily  folic acid 1 milliGRAM(s) Oral daily  furosemide   Injectable 40 milliGRAM(s) IV Push daily  hydrochlorothiazide 12.5 milliGRAM(s) Oral daily  levothyroxine 50 MICROGram(s) Oral daily  metoprolol tartrate 25 milliGRAM(s) Oral two times a day  potassium chloride    Tablet ER 10 milliEquivalent(s) Oral daily  predniSONE   Tablet 10 milliGRAM(s) Oral daily    MEDICATIONS  (PRN):  acetaminophen   Tablet .. 650 milliGRAM(s) Oral every 6 hours PRN Temp greater or equal to 38C (100.4F), Mild Pain (1 - 3)    REVIEW OF SYSTEMS:    Constitutional:            No fever, weight loss or fatigue  HEENT:                       No difficulty hearing, tinnitus, vertigo; No sinus or throat pain  Respiratory:               sob  Cardiovascular:          chest pain,   Gastrointestinal:        No abdominal or epigastric pain. No N/V/diarrhea or hematemesis  Genitourinary:            No dysuria, frequency, hematuria or incontinence  SKIN:                           no rash  Musculoskeletal:        No joint pain or swelling  Extremities:                No swelling  Neurological:              No headaches  Psychiatric:                 No depression, anxiety    MACRA & MIPS:  Vaccines - Influenza: yes and Pneumovax: no  Tobacco: ex smoker  Blood Pressure Screening / Control of: 124/50  Current Medications Reviewed: yes    Vital Signs Last 24 Hrs  T(C): 36.6 (2019 11:34), Max: 36.8 (2019 17:05)  T(F): 97.9 (2019 11:34), Max: 98.2 (2019 17:05)  HR: 69 (2019 11:34) (69 - 91)  BP: 114/61 (2019 11:34) (94/74 - 124/50)  BP(mean): --  RR: 18 (2019 11:34) (16 - 18)  SpO2: 96% (:34) (96% - 98%)    PHYSICAL EXAM:  GEN:         Awake, responsive and comfortable.  HEENT:      Normal.    RESP:         no wheezing.  CVS:           Regular rate and rhythm.   ABD:         Soft, non-tender, non-distended;   :             No costovertebral angle tenderness  SKIN:           Warm and dry.  EXTR:            No clubbing, cyanosis or edema  CNS:              Intact sensory and motor function.  PSYCH:        cooperative, no anxiety or depression    LABS:                        10.7   11.29 )-----------( 240      ( 2019 07:33 )             32.1         138  |  99  |  16  ----------------------------<  101<H>  3.1<L>   |  33<H>  |  1.20    Ca    8.7      2019 07:33    TPro  7.4  /  Alb  3.7  /  TBili  0.6  /  DBili  x   /  AST  17  /  ALT  19  /  AlkPhos  86      PT/INR - ( 2019 04:47 )   PT: 13.9 sec;   INR: 1.23 ratio         PTT - ( 2019 04:47 )  PTT:31.2 sec   @ 08:01  pH: 7.48  pCO2: 36  pO2: 82  SaO2: 96    Culture - Blood (collected 19 @ 11:51)  Source: .Blood Blood  Preliminary Report (19 @ 12:01):    No growth to date.    Culture - Blood (collected 19 @ 11:51)  Source: .Blood Blood  Preliminary Report (19 @ 12:01):    No growth to date.    EKG: A Fib.    RADIOLOGY & ADDITIONAL STUDIES:  < from: CT Angio Chest w/ IV Cont (19 @ 06:37) >    EXAM:  CT ANGIO CHEST (W)AW IC                          PROCEDURE DATE:  2019      INTERPRETATION:  Chest CT angiography (Pulmonary embolism protocol)    Indication:  Shortness of breath    Technique:  Axial postcontrast images were obtained during maximal   projected opacification of the pulmonary arterial vasculature. 75 cc of   Omnipaque 350 was administered intravenously without complication and 25   cc was discarded.  Reformatted coronal and sagittal images are submitted.    Axial MIP images are submitted.    Comparison:  none    FINDINGS:    Pulmonary arterial opacification is optimal.  There is no evidence of   pulmonary embolism.  The great vessels are not dilated.  No grossly   obvious aortic dissection.    The visualized neck, axilla and subcutaneous tissues are unremarkable.    The tracheobronchial tree is patent centrally.  There is no significant   mediastinal or hilar adenopathy.      The heart is enlarged. Coronary atherosclerosis. There is no pericardial   effusion.    1.6 cm groundglass attenuation focus in the right upper lobe. Dependent   atelectatic changes at the lung bases.  There is no pleural abnormality.    The visualized bones and upper abdomen are unremarkable.    IMPRESSION:    No evidence of pulmonary embolism.  There is a 1.6 cm groundglass nodule in the right upper lobe. This may be   followed up with chest CT in 4-6 weeks.  Cardiomegaly.    TONY KHAN M.D, ATTENDING RADIOLOGIST  This document has been electronically signed. 2019  7:06AM    < from: TTE Echo Doppler w/o Cont (19 @ 11:14) >     EXAM:  TTE WO CON COMPLETE W DOPPL      PROCEDURE DATE:  2019      INTERPRETATION:  REPORT:    TRANSTHORACIC ECHOCARDIOGRAM REPORT    Patient Name:   ROSE MARIE RINCON Patient Location: [facility] Rec #:  [id number]  Accession #:      [id number]  Account #:                  Height:           66.9 in 170.0 cm  YOB: 1940   Weight:           160.1 lb 72.60 kg  Patient Age:    79 years    BSA:              1.84 m²  Patient Gender: F           BP:               138/76 mmHg    Date of Exam:        2019 11:14:52 AM  Sonographer:         JACKIE  Referring Physician: MCKINLEY    Procedure:     2D Echo/Doppler/Color Doppler Complete.  Indications:   Chest pain, unspecified - R07.9  Diagnosis:     Chest pain, unspecified - R07.9  Study Details: Technically fair study.    2D AND M-MODE MEASUREMENTS (normal ranges within parentheses):  Left Ventricle:                  Normal   Aorta/Left Atrium:            Normal  IVSd (2D):              1.08 cm (0.7-1.1) Aortic Root (2D):  3.68 cm   (2.4-3.7)  LVPWd (2D):             0.97 cm (0.7-1.1) Left Atrium (2D):  4.71 cm   (1.9-4.0)  LVIDd (2D):             4.62 cm (3.4-5.7) Right Ventricle:  LVIDs (2D):             3.62 cm           TAPSE:           1.51 cm  LV FS (2D):         21.7 %   (>25%)  Relative Wall Thickness  0.42    (<0.42)    LV DIASTOLIC FUNCTION:  MV Peak E: 0.91 m/s Decel Time: 166 msec  MV Peak A: 0.50 m/s  E/A Ratio: 1.82    SPECTRAL DOPPLER ANALYSIS (where applicable):  Mitral Valve:  MV P1/2 Time: 48.07 msec  MV Area, PHT: 4.58 cm²    Aortic Valve: AoV Max Davin: 1.52 m/s AoV Peak P.3 mmHg AoV Mean P.4 mmHg    LVOT Vmax: 1.13 m/s LVOT VTI: 0.174 m LVOT Diameter: 2.05 cm    AoV Area, Vmax: 2.47 cm² AoV Area, VTI: 2.75 cm² AoV Area, Vmn: 2.04 cm²    Tricuspid Valve and PA/RV Systolic Pressure: TR Max Velocity: 2.97 m/s RA   Pressure: 10 mmHg RVSP/PASP: 45.2 mmHg    PHYSICIAN INTERPRETATION:  Left Ventricle: The left ventricular internal cavity size is normal. Left   ventricular wall thickness is normal.  Global LV systolic function was normal. Left ventricular ejection   fraction, by visual estimation, is 55 to 60%. The mitral in-flow pattern   reveals no discernable A-wave, therefore no comment on diastolic function   can bemade.  Right Ventricle: Normal right ventricular size and function.  Left Atrium: Mildly enlarged left atrium.  Right Atrium: Normal right atrial size.  Pericardium: There is no evidence of pericardial effusion.  Mitral Valve: Mild thickening and calcification of the anterior and   posterior mitral valve leaflets. Mitral leaflet mobility is normal. Mild   mitral valve regurgitation is seen.  Tricuspid Valve: The tricuspid valve is normal in structure. Mild   tricuspid regurgitation is visualized.Estimated pulmonary artery   systolic pressure is 45.2 mmHg assuming a right atrial pressure of 10   mmHg, which is consistent with mild pulmonary hypertension.  Aortic Valve: The aortic valve is trileaflet. Peak transaortic gradient   equals 9.3 mmHg, mean transaortic gradient equals 4.4 mmHg, the   calculated aortic valve area equals 2.75 cm² by the continuity equation   consistent with normally opening aortic valve. No evidence of aortic   valve regurgitation is seen.  Pulmonic Valve: The pulmonic valve was not well visualized. Trace   pulmonic valve regurgitation.  Aorta: Aortic root measured at Sinus of Valsalva is normal.  Venous: The inferior vena cava was normal sized, with respiratory size   variation greater than 50%.    Summary:   1. Left ventricular ejection fraction, by visual estimation, is 55 to   60%.   2. Technically fair study.   3. Normal global left ventricular systolic function.   4. Normal left ventricular internal cavity size.   5. The mitral in-flow pattern reveals no discernable A-wave, therefore   no comment on diastolic function can be made.   6. Normal right ventricular size and function.   7. There is no evidence of pericardial effusion.   8. Mild mitral valve regurgitation.   9. Mild thickening and calcification of the anterior and posterior   mitral valve leaflets.  10. Estimated pulmonary artery systolic pressure is 45.2 mmHg assuming a   right atrial pressure of 10 mmHg, which is consistent with mild pulmonary   hypertension.    Gigi Martin MD FACC, FASE, FACP  Electronically signed on 2019 at 7:25:20 PM     GIGI MARTIN   This document has been electronically signed. 2019 11:14AM      ASSESSMENT AND PLAN:  ·	SOB.  ·	Chest pain, likely Angina.  ·	Suspect Diastolic Dysfunction.  ·	Chronic A Fib.  ·	PAD with RL extremity stent  ·	HTN  ·	Anemia.  ·	Hypokalemia.  ·	Rheumatoid Arthritis.  ·	Right Breast CA S/P Lumpectomy S/P Chemo+ Radiation.    Pain better now , no acute EKG changes, troponin normal and Echo show no significant abnormality.  Consider Stress test.  Will get Sedrate, CHERRIE and RF for possibility of vasculitis.

## 2019-11-02 NOTE — PROGRESS NOTE ADULT - PROBLEM SELECTOR PLAN 8
DVT Prophylaxis-on Eliquis for Afib  General precautions Continue methotrexate-takes on Sundays.  Started prednisone for possible flare (chest, shoulder, neck pain).

## 2019-11-02 NOTE — PROGRESS NOTE ADULT - SUBJECTIVE AND OBJECTIVE BOX
79 year old female with PMH rheumatoid arthritis, HTN, afib on eliquis, RLE stent on plavix, Breast ca presents to ED for evaluation of shortness of breath.    Today:  Pt reports improvement in symptoms.  TTE shows pulm HTN.      REVIEW OF SYSTEMS:  No new complaints    MEDICATIONS  (STANDING):  amLODIPine   Tablet 5 milliGRAM(s) Oral daily  apixaban 5 milliGRAM(s) Oral every 12 hours  aspirin enteric coated 81 milliGRAM(s) Oral daily  atorvastatin 10 milliGRAM(s) Oral at bedtime  clopidogrel Tablet 75 milliGRAM(s) Oral daily  folic acid 1 milliGRAM(s) Oral daily  furosemide   Injectable 40 milliGRAM(s) IV Push daily  hydrochlorothiazide 12.5 milliGRAM(s) Oral daily  levothyroxine 50 MICROGram(s) Oral daily  metoprolol tartrate 25 milliGRAM(s) Oral two times a day  potassium chloride    Tablet ER 10 milliEquivalent(s) Oral daily  predniSONE   Tablet 10 milliGRAM(s) Oral daily    MEDICATIONS  (PRN):  acetaminophen   Tablet .. 650 milliGRAM(s) Oral every 6 hours PRN Temp greater or equal to 38C (100.4F), Mild Pain (1 - 3)          Vital Signs Last 24 Hrs  T(C): 36.6 (02 Nov 2019 11:34), Max: 36.8 (01 Nov 2019 17:05)  T(F): 97.9 (02 Nov 2019 11:34), Max: 98.2 (01 Nov 2019 17:05)  HR: 69 (02 Nov 2019 11:34) (69 - 91)  BP: 114/61 (02 Nov 2019 11:34) (94/74 - 124/50)  BP(mean): --  RR: 18 (02 Nov 2019 11:34) (16 - 18)  SpO2: 96% (02 Nov 2019 11:34) (96% - 98%)    PHYSICAL EXAM:    GENERAL: NAD, well-groomed, well-developed  HEAD:  Atraumatic, Normocephalic  EYES: EOMI, PERRLA, conjunctiva and sclera clear  NECK: Supple, No JVD, Normal thyroid  NERVOUS SYSTEM:  Alert & Oriented X3, Good concentration  CHEST/LUNG: Clear to percussion bilaterally; No rales, rhonchi, wheezing, or rubs  HEART: Regular rate and rhythm; No murmurs, rubs, or gallops  ABDOMEN: Soft, Nontender, Nondistended; Bowel sounds present  EXTREMITIES:  2+ Peripheral Pulses, No clubbing, cyanosis, or edema      LABS:                        10.7   11.29 )-----------( 240      ( 02 Nov 2019 07:33 )             32.1     11-02    138  |  99  |  16  ----------------------------<  101<H>  3.1<L>   |  33<H>  |  1.20    Ca    8.7      02 Nov 2019 07:33    TPro  7.4  /  Alb  3.7  /  TBili  0.6  /  DBili  x   /  AST  17  /  ALT  19  /  AlkPhos  86  11-01    PT/INR - ( 01 Nov 2019 04:47 )   PT: 13.9 sec;   INR: 1.23 ratio         PTT - ( 01 Nov 2019 04:47 )  PTT:31.2 sec

## 2019-11-02 NOTE — PROGRESS NOTE ADULT - PROBLEM SELECTOR PROBLEM 8
Preventive measure Rheumatoid arthritis involving multiple sites, unspecified rheumatoid factor presence

## 2019-11-02 NOTE — PROGRESS NOTE ADULT - SUBJECTIVE AND OBJECTIVE BOX
CHIEF COMPLAINT:  Patient is a 79y old  Female who presents with a chief complaint of Chest pain, SOB (01 Nov 2019 08:18)      HPI:  79 year old female with rheumatoid arthritis, HTN, afib on eliquis, RLE stent on plavix, Breast ca presented with shortness of breath with chest pressure.  She states that she has felt fine until last night when she suddenly felt nauseated, "weak all over", and developed substernal chest pain.  Pain radiated up b/l neck and to shoulders.  Pain is both pleuritic and occurs at rest.  She denies palpitations, cough, fever, chills.  Strong history of CAD on father's side (several deaths in 30s). EKG shows Afib, BNP 1878, troponin neg x 1, lactate normal, flu normal, CTA chest shows b/l dependent atelectasis, 1.6 cm groundglass opacity, no PE.    ALLERGIES:  No Known Allergies      Home Medications:  amLODIPine 5 mg oral tablet: 1 tab(s) orally once a day (01 Nov 2019 06:15)  atorvastatin 10 mg oral tablet: 1 tab(s) orally once a day (01 Nov 2019 06:15)  clopidogrel 75 mg oral tablet: 1 tab(s) orally once a day (01 Nov 2019 06:15)  Eliquis 5 mg oral tablet: 1 tab(s) orally 2 times a day (01 Nov 2019 06:15)  folic acid 1 mg oral tablet: 1 tab(s) orally once a day (01 Nov 2019 06:15)  hydroCHLOROthiazide 12.5 mg oral tablet: 1 tab(s) orally once a day (01 Nov 2019 06:15)  levothyroxine 50 mcg (0.05 mg) oral tablet: 1 tab(s) orally once a day (01 Nov 2019 06:15)  methotrexate 2.5 mg oral tablet:  (01 Nov 2019 06:15)  metoprolol tartrate 25 mg oral tablet: 1 tab(s) orally 2 times a day (01 Nov 2019 06:15)    PAST MEDICAL & SURGICAL HISTORY:  A-fib  HTN (hypertension)  H/O synovectomy  H/O breast surgery  History of hip surgery        FAMILY HISTORY:  n/a    SOCIAL HISTORY:    REVIEW OF SYSTEMS:  General:  No wt loss, fevers, chills, night sweats  Eyes:  Good vision, no reported pain  ENT:  No sore throat, pain, runny nose, dysphagia  CV:  No pain, palpitations, hypo/hypertension  Resp:  No dyspnea, cough, tachypnea, wheezing  GI:  No pain, nausea, vomiting, diarrhea, constipation  :  No pain, bleeding, incontinence, nocturia  Muscle:  No pain, weakness  Breast:  No pain, abscess, mass, discharge  Neuro:  No weakness, tingling, memory problems  Psych:  No fatigue, insomnia, mood problems, depression  Endocrine:  No polyuria, polydipsia, cold/heat intolerance  Heme:  No petechiae, ecchymosis, easy bruisability  Skin:  No rash, edema    PHYSICAL EXAM:  Vital Signs:  Vital Signs Last 24 Hrs  T(C): 36.2 (01 Nov 2019 13:10), Max: 37.6 (01 Nov 2019 09:22)  T(F): 97.2 (01 Nov 2019 13:10), Max: 99.6 (01 Nov 2019 09:22)  HR: 85 (01 Nov 2019 13:10) (85 - 103)  BP: 134/65 (01 Nov 2019 13:10) (99/77 - 146/65)  RR: 16 (01 Nov 2019 13:10) (16 - 19)  SpO2: 96% (01 Nov 2019 13:10) (96% - 100%)    Tele: AFib    Constitutional: well developed, normal appearance, well groomed, well nourished, no deformities and no acute distress.   Eyes: the conjunctiva exhibited no abnormalities and the eyelids demonstrated no xanthelasmas.   HEENT: normal oral mucosa, no oral pallor and no oral cyanosis.   Neck: normal jugular venous A waves present, normal jugular venous V waves present and no jugular venous fine A waves.   Pulmonary: no respiratory distress, normal respiratory rhythm and effort, no accessory muscle use and lungs were clear to auscultation bilaterally.   Cardiovascular: heart rate and rhythm were normal, normal S1 and S2 and no murmur, gallop, rub, heave or thrill are present.   Abdomen: soft, non-tender, no hepato-splenomegaly and no abdominal mass palpated.   Musculoskeletal: the gait could not be assessed.   Extremities: no clubbing of the fingernails, no localized cyanosis, no petechial hemorrhages and no ischemic changes. +1 b/l lower leg edema.  Skin: normal skin color and pigmentation, no rash, no venous stasis, no skin lesions, no skin ulcer and no xanthoma was observed.   Psychiatric: oriented to person, place, and time, the affect was normal, the mood was normal and not feeling anxious.      LABORATORY:                          11.9   13.40 )-----------( 297      ( 01 Nov 2019 04:47 )             35.2     11-01    137  |  100  |  21  ----------------------------<  113<H>  3.1<L>   |  29  |  1.15    Ca    9.3      01 Nov 2019 04:47    TPro  7.4  /  Alb  3.7  /  TBili  0.6  /  DBili  x   /  AST  17  /  ALT  19  /  AlkPhos  86  11-01    ABG - ( 01 Nov 2019 08:01 )  pH, Arterial: x     pH, Blood: 7.48  /  pCO2: 36    /  pO2: 82    / HCO3: 27    / Base Excess: 3.8   /  SaO2: 96            CARDIAC MARKERS ( 01 Nov 2019 13:43 )  <.015 ng/mL / x     / 100 U/L / x     / <1.0 ng/mL  CARDIAC MARKERS ( 01 Nov 2019 04:47 )  <.015 ng/mL / x     / x     / x     / x          LIVER FUNCTIONS - ( 01 Nov 2019 04:47 )  Alb: 3.7 g/dL / Pro: 7.4 gm/dL / ALK PHOS: 86 U/L / ALT: 19 U/L / AST: 17 U/L / GGT: x           PT/INR - ( 01 Nov 2019 04:47 )   PT: 13.9 sec;   INR: 1.23 ratio         PTT - ( 01 Nov 2019 04:47 )  PTT:31.2 sec    BNPSerum Pro-Brain Natriuretic Peptide: 1878 pg/mL (11-01-19 @ 08:11)      IMAGING:  < from: CT Angio Chest w/ IV Cont (11.01.19 @ 06:37) >  No evidence of pulmonary embolism.  There is a 1.6 cm groundglass nodule in the right upper lobe. This may be   followed up with chest CT in 4-6 weeks.  Cardiomegaly.    < end of copied text >      ASSESSMENT:  79 year old female with PMH rheumatoid arthritis, HTN, afib on eliquis, RLE stent on plavix, Breast ca presented with shortness of breath with chest pressure.  She states that she has felt fine until last night when she suddenly felt nauseated, "weak all over", and developed substernal chest pain.  Pain radiated up b/l neck and to shoulders.  Pain is both pleuritic and occurs at rest.  She denies palpitations, cough, fever, chills.  Strong history of CAD on father's side (several deaths in 30s). EKG shows Afib, BNP 1878, troponin neg x 1, lactate normal, flu normal, CTA chest shows b/l dependent atelectasis, 1.6 cm groundglass opacity, no PE. Finding of cardiomegaly?    PLAN:     Tele monitoring.    MEDICATIONS  (STANDING):  amLODIPine   Tablet 5 milliGRAM(s) Oral daily  apixaban 5 milliGRAM(s) Oral every 12 hours  atorvastatin 10 milliGRAM(s) Oral at bedtime  clopidogrel Tablet 75 milliGRAM(s) Oral daily  folic acid 1 milliGRAM(s) Oral daily  furosemide   Injectable 40 milliGRAM(s) IV Push daily  hydrochlorothiazide 12.5 milliGRAM(s) Oral daily  levothyroxine 50 MICROGram(s) Oral daily  metoprolol tartrate 25 milliGRAM(s) Oral two times a day  potassium chloride    Tablet ER 10 milliEquivalent(s) Oral daily    Element of acute HF unclear type. To be clarified with echo.  Keep outs more than inputs.  fluid/salt restriction.  f/u labs.  K supplement.    Rian Daniel MD, FACC, FASE, FASNC, FACP  Director, Heart Failure Services  [facility]  , Department of Cardiology  [facility] CHIEF COMPLAINT:  Patient is a 79y old  Female who presents with a chief complaint of Chest pain, SOB (01 Nov 2019 08:18)      HPI:  79 year old female with rheumatoid arthritis, HTN, afib on eliquis, RLE stent on plavix, Breast ca presented with shortness of breath with chest pressure.  She states that she has felt fine until last night when she suddenly felt nauseated, "weak all over", and developed substernal chest pain.  Pain radiated up b/l neck and to shoulders.  Pain is both pleuritic and occurs at rest.  She denies palpitations, cough, fever, chills.  Strong history of CAD on father's side (several deaths in 30s). EKG shows Afib, BNP 1878, troponin neg x 1, lactate normal, flu normal, CTA chest shows b/l dependent atelectasis, 1.6 cm groundglass opacity, no PE. Still with [place] to br. Less cp.       REVIEW OF SYSTEMS:  General:  No wt loss, fevers, chills, night sweats  Eyes:  Good vision, no reported pain  ENT:  No sore throat, pain, runny nose, dysphagia  CV:  No pain, palpitations, hypo/hypertension  Resp:  No dyspnea, cough, tachypnea, wheezing  GI:  No pain, nausea, vomiting, diarrhea, constipation  :  No pain, bleeding, incontinence, nocturia  Muscle:  No pain, weakness  Breast:  No pain, abscess, mass, discharge  Neuro:  No weakness, tingling, memory problems  Psych:  No fatigue, insomnia, mood problems, depression  Endocrine:  No polyuria, polydipsia, cold/heat intolerance  Heme:  No petechiae, ecchymosis, easy bruisability  Skin:  No rash, edema    PHYSICAL EXAM:  Vital Signs Last 24 Hrs  T(C): 37.8 (02 Nov 2019 17:25), Max: 37.8 (02 Nov 2019 17:25)  T(F): 100 (02 Nov 2019 17:25), Max: 100 (02 Nov 2019 17:25)  HR: 84 (02 Nov 2019 17:25) (69 - 91)  BP: 104/61 (02 Nov 2019 17:25) (94/74 - 114/61)  RR: 18 (02 Nov 2019 17:25) (16 - 18)  SpO2: 95% (02 Nov 2019 17:25) (95% - 98%)    Tele: AFib    Constitutional: well developed, normal appearance, well groomed, well nourished, no deformities and no acute distress.   Eyes: the conjunctiva exhibited no abnormalities and the eyelids demonstrated no xanthelasmas.   HEENT: normal oral mucosa, no oral pallor and no oral cyanosis.   Neck: normal jugular venous A waves present, normal jugular venous V waves present and no jugular venous fine A waves.   Pulmonary: no respiratory distress, normal respiratory rhythm and effort, no accessory muscle use and lungs were clear to auscultation bilaterally.   Cardiovascular: heart rate and rhythm were normal, normal S1 and S2 and no murmur, gallop, rub, heave or thrill are present.   Abdomen: soft, non-tender, no hepato-splenomegaly and no abdominal mass palpated.   Musculoskeletal: the gait could not be assessed.   Extremities: no clubbing of the fingernails, no localized cyanosis, no petechial hemorrhages and no ischemic changes. +1 b/l lower leg edema.  Skin: normal skin color and pigmentation, no rash, no venous stasis, no skin lesions, no skin ulcer and no xanthoma was observed.   Psychiatric: oriented to person, place, and time, the affect was normal, the mood was normal and not feeling anxious.      LABORATORY:                          10.7   11.29 )-----------( 240      ( 02 Nov 2019 07:33 )             32.1     11-02    138  |  99  |  16  ----------------------------<  101<H>  3.1<L>   |  33<H>  |  1.20    Ca    8.7      02 Nov 2019 07:33    TPro  7.4  /  Alb  3.7  /  TBili  0.6  /  DBili  x   /  AST  17  /  ALT  19  /  AlkPhos  86  11-01                         BNPSerum Pro-Brain Natriuretic Peptide: 1878 pg/mL (11-01-19 @ 08:11)      IMAGING:  < from: CT Angio Chest w/ IV Cont (11.01.19 @ 06:37) >  No evidence of pulmonary embolism.  There is a 1.6 cm groundglass nodule in the right upper lobe. This may be   followed up with chest CT in 4-6 weeks.  Cardiomegaly.    < end of copied text >      ASSESSMENT:  79 year old female with PMH rheumatoid arthritis, HTN, afib on eliquis, RLE stent on plavix, Breast ca presented with shortness of breath with chest pressure.  She states that she has felt fine until last night when she suddenly felt nauseated, "weak all over", and developed substernal chest pain.  Pain radiated up b/l neck and to shoulders.  Pain is both pleuritic and occurs at rest.  She denies palpitations, cough, fever, chills.  Strong history of CAD on father's side (several deaths in 30s). EKG shows Afib, BNP 1878, troponin neg x 1, lactate normal, flu normal, CTA chest shows b/l dependent atelectasis, 1.6 cm groundglass opacity, no PE. Finding of cardiomegaly?    PLAN:     Tele monitoring.    MEDICATIONS  (STANDING):  amLODIPine   Tablet 5 milliGRAM(s) Oral daily  apixaban 5 milliGRAM(s) Oral every 12 hours  atorvastatin 10 milliGRAM(s) Oral at bedtime  clopidogrel Tablet 75 milliGRAM(s) Oral daily  folic acid 1 milliGRAM(s) Oral daily  furosemide   Injectable 40 milliGRAM(s) IV Push daily  hydrochlorothiazide 12.5 milliGRAM(s) Oral daily  levothyroxine 50 MICROGram(s) Oral daily  metoprolol tartrate 25 milliGRAM(s) Oral two times a day  potassium chloride    Tablet ER 10 milliEquivalent(s) Oral daily    Element of acute HF unclear type. To be clarified with echo.  Keep outs more than inputs.  fluid/salt restriction.  f/u labs.  K supplement.    Rian Daniel MD, FACC, FASE, FASNC, FACP  Director, Heart Failure Services  [facility]  , Department of Cardiology  [facility] CHIEF COMPLAINT:  Patient is a 79y old  Female who presents with a chief complaint of Chest pain, SOB (01 Nov 2019 08:18)      HPI:  79 year old female with rheumatoid arthritis, HTN, afib on eliquis, RLE stent on plavix, Breast ca presented with shortness of breath with chest pressure.  She states that she has felt fine until last night when she suddenly felt nauseated, "weak all over", and developed substernal chest pain.  Pain radiated up b/l neck and to shoulders.  Pain is both pleuritic and occurs at rest.  She denies palpitations, cough, fever, chills.  Strong history of CAD on father's side (several deaths in 30s). EKG shows Afib, BNP 1878, troponin neg x 1, lactate normal, flu normal, CTA chest shows b/l dependent atelectasis, 1.6 cm groundglass opacity, no PE. Still with [place] to br. Less cp.       REVIEW OF SYSTEMS:  General:  No wt loss, fevers, chills, night sweats  Eyes:  Good vision, no reported pain  ENT:  No sore throat, pain, runny nose, dysphagia  CV:  No pain, palpitations, hypo/hypertension  Resp:  No dyspnea, cough, tachypnea, wheezing  GI:  No pain, nausea, vomiting, diarrhea, constipation  :  No pain, bleeding, incontinence, nocturia  Muscle:  No pain, weakness  Breast:  No pain, abscess, mass, discharge  Neuro:  No weakness, tingling, memory problems  Psych:  No fatigue, insomnia, mood problems, depression  Endocrine:  No polyuria, polydipsia, cold/heat intolerance  Heme:  No petechiae, ecchymosis, easy bruisability  Skin:  No rash, edema    PHYSICAL EXAM:  Vital Signs Last 24 Hrs  T(C): 37.8 (02 Nov 2019 17:25), Max: 37.8 (02 Nov 2019 17:25)  T(F): 100 (02 Nov 2019 17:25), Max: 100 (02 Nov 2019 17:25)  HR: 84 (02 Nov 2019 17:25) (69 - 91)  BP: 104/61 (02 Nov 2019 17:25) (94/74 - 114/61)  RR: 18 (02 Nov 2019 17:25) (16 - 18)  SpO2: 95% (02 Nov 2019 17:25) (95% - 98%)    Tele: AFib    Constitutional: well developed, normal appearance, well groomed, well nourished, no deformities and no acute distress.   Eyes: the conjunctiva exhibited no abnormalities and the eyelids demonstrated no xanthelasmas.   HEENT: normal oral mucosa, no oral pallor and no oral cyanosis.   Neck: normal jugular venous A waves present, normal jugular venous V waves present and no jugular venous fine A waves.   Pulmonary: no respiratory distress, normal respiratory rhythm and effort, no accessory muscle use and lungs were clear to auscultation bilaterally.   Cardiovascular: heart rate and rhythm were normal, normal S1 and S2 and no murmur, gallop, rub, heave or thrill are present.   Abdomen: soft, non-tender, no hepato-splenomegaly and no abdominal mass palpated.   Musculoskeletal: the gait could not be assessed.   Extremities: no clubbing of the fingernails, no localized cyanosis, no petechial hemorrhages and no ischemic changes. +1 b/l lower leg edema.  Skin: normal skin color and pigmentation, no rash, no venous stasis, no skin lesions, no skin ulcer and no xanthoma was observed.   Psychiatric: oriented to person, place, and time, the affect was normal, the mood was normal and not feeling anxious.      LABORATORY:                          10.7   11.29 )-----------( 240      ( 02 Nov 2019 07:33 )             32.1     11-02    138  |  99  |  16  ----------------------------<  101<H>  3.1<L>   |  33<H>  |  1.20    Ca    8.7      02 Nov 2019 07:33    TPro  7.4  /  Alb  3.7  /  TBili  0.6  /  DBili  x   /  AST  17  /  ALT  19  /  AlkPhos  86  11-01                         BNPSerum Pro-Brain Natriuretic Peptide: 1878 pg/mL (11-01-19 @ 08:11)      IMAGING:  < from: CT Angio Chest w/ IV Cont (11.01.19 @ 06:37) >  No evidence of pulmonary embolism.  There is a 1.6 cm groundglass nodule in the right upper lobe. This may be   followed up with chest CT in 4-6 weeks.  Cardiomegaly.    < end of copied text >    Summary:   1. Left ventricular ejection fraction, by visual estimation, is 55 to   60%.   2. Technically fair study.   3. Normal global left ventricular systolic function.   4. Normal left ventricular internal cavity size.   5. The mitral in-flow pattern reveals no discernable A-wave, therefore   no comment on diastolic function can be made.   6. Normal right ventricular size and function.   7. There is no evidence of pericardial effusion.   8. Mild mitral valve regurgitation.   9. Mild thickening and calcification of the anterior and posterior   mitral valve leaflets.  10. Estimated pulmonary artery systolic pressure is 45.2 mmHg assuming a   right atrial pressure of 10 mmHg, which is consistent with mild pulmonary   hypertension.    ASSESSMENT:  79 year old female with PMH rheumatoid arthritis, HTN, afib on eliquis, RLE stent on plavix, Breast ca presented with shortness of breath with chest pressure.  She states that she has felt fine until last night when she suddenly felt nauseated, "weak all over", and developed substernal chest pain.  Pain radiated up b/l neck and to shoulders.  Pain is both pleuritic and occurs at rest.  She denies palpitations, cough, fever, chills.  Strong history of CAD on father's side (several deaths in 30s). EKG shows Afib, BNP 1878, troponin neg x 1, lactate normal, flu normal, CTA chest shows b/l dependent atelectasis, 1.6 cm groundglass opacity, no PE. No cp now. Still with [place].    PLAN:     Tele monitoring.    MEDICATIONS  (STANDING):  amLODIPine   Tablet 5 milliGRAM(s) Oral daily  apixaban 5 milliGRAM(s) Oral every 12 hours  aspirin enteric coated 81 milliGRAM(s) Oral daily  atorvastatin 10 milliGRAM(s) Oral at bedtime  clopidogrel Tablet 75 milliGRAM(s) Oral daily  folic acid 1 milliGRAM(s) Oral daily  furosemide   Injectable 40 milliGRAM(s) IV Push daily  hydrochlorothiazide 12.5 milliGRAM(s) Oral daily  levothyroxine 50 MICROGram(s) Oral daily  methotrexate 15 milliGRAM(s) Oral every week  metoprolol tartrate 25 milliGRAM(s) Oral two times a day  potassium chloride    Tablet ER 10 milliEquivalent(s) Oral daily  predniSONE   Tablet 10 milliGRAM(s) Oral daily      Element of acute on chronic HFpEF.  Keep outs more than inputs.  fluid/salt restriction.  Pulm eval.    Rian Daniel MD, FACC, FASE, FASNC, FACP  Director, Heart Failure Services  [facility]  , Department of Cardiology  [facility]

## 2019-11-02 NOTE — PROGRESS NOTE ADULT - ASSESSMENT
79 year old female with PMH rheumatoid arthritis, HTN, afib on eliquis, RLE stent on plavix, Breast ca presents to ED for evaluation of shortness of breath.

## 2019-11-02 NOTE — PROGRESS NOTE ADULT - PROBLEM SELECTOR PLAN 7
Continue methotrexate-takes on Sundays.  Started prednisone for possible flare (chest, shoulder, neck pain). Continue Lipitor

## 2019-11-03 ENCOUNTER — TRANSCRIPTION ENCOUNTER (OUTPATIENT)
Age: 79
End: 2019-11-03

## 2019-11-03 VITALS
DIASTOLIC BLOOD PRESSURE: 60 MMHG | HEART RATE: 76 BPM | SYSTOLIC BLOOD PRESSURE: 110 MMHG | RESPIRATION RATE: 18 BRPM | TEMPERATURE: 98 F | OXYGEN SATURATION: 98 %

## 2019-11-03 LAB
ANION GAP SERPL CALC-SCNC: 8 MMOL/L — SIGNIFICANT CHANGE UP (ref 5–17)
BASOPHILS # BLD AUTO: 0.03 K/UL — SIGNIFICANT CHANGE UP (ref 0–0.2)
BASOPHILS NFR BLD AUTO: 0.3 % — SIGNIFICANT CHANGE UP (ref 0–2)
BUN SERPL-MCNC: 15 MG/DL — SIGNIFICANT CHANGE UP (ref 7–23)
CALCIUM SERPL-MCNC: 9.2 MG/DL — SIGNIFICANT CHANGE UP (ref 8.5–10.1)
CHLORIDE SERPL-SCNC: 102 MMOL/L — SIGNIFICANT CHANGE UP (ref 96–108)
CO2 SERPL-SCNC: 29 MMOL/L — SIGNIFICANT CHANGE UP (ref 22–31)
CREAT SERPL-MCNC: 1.05 MG/DL — SIGNIFICANT CHANGE UP (ref 0.5–1.3)
EOSINOPHIL # BLD AUTO: 0.13 K/UL — SIGNIFICANT CHANGE UP (ref 0–0.5)
EOSINOPHIL NFR BLD AUTO: 1.1 % — SIGNIFICANT CHANGE UP (ref 0–6)
ERYTHROCYTE [SEDIMENTATION RATE] IN BLOOD: 88 MM/HR — HIGH (ref 0–20)
GLUCOSE SERPL-MCNC: 106 MG/DL — HIGH (ref 70–99)
HCT VFR BLD CALC: 33.8 % — LOW (ref 34.5–45)
HGB BLD-MCNC: 11.1 G/DL — LOW (ref 11.5–15.5)
IMM GRANULOCYTES NFR BLD AUTO: 0.5 % — SIGNIFICANT CHANGE UP (ref 0–1.5)
LYMPHOCYTES # BLD AUTO: 1.17 K/UL — SIGNIFICANT CHANGE UP (ref 1–3.3)
LYMPHOCYTES # BLD AUTO: 10.1 % — LOW (ref 13–44)
MCHC RBC-ENTMCNC: 32.4 PG — SIGNIFICANT CHANGE UP (ref 27–34)
MCHC RBC-ENTMCNC: 32.8 GM/DL — SIGNIFICANT CHANGE UP (ref 32–36)
MCV RBC AUTO: 98.5 FL — SIGNIFICANT CHANGE UP (ref 80–100)
MONOCYTES # BLD AUTO: 0.99 K/UL — HIGH (ref 0–0.9)
MONOCYTES NFR BLD AUTO: 8.6 % — SIGNIFICANT CHANGE UP (ref 2–14)
NEUTROPHILS # BLD AUTO: 9.18 K/UL — HIGH (ref 1.8–7.4)
NEUTROPHILS NFR BLD AUTO: 79.4 % — HIGH (ref 43–77)
NRBC # BLD: 0 /100 WBCS — SIGNIFICANT CHANGE UP (ref 0–0)
PLATELET # BLD AUTO: 272 K/UL — SIGNIFICANT CHANGE UP (ref 150–400)
POTASSIUM SERPL-MCNC: 3.4 MMOL/L — LOW (ref 3.5–5.3)
POTASSIUM SERPL-SCNC: 3.4 MMOL/L — LOW (ref 3.5–5.3)
RBC # BLD: 3.43 M/UL — LOW (ref 3.8–5.2)
RBC # FLD: 15.6 % — HIGH (ref 10.3–14.5)
RHEUMATOID FACT SERPL-ACNC: 15 IU/ML — HIGH (ref 0–13)
SODIUM SERPL-SCNC: 139 MMOL/L — SIGNIFICANT CHANGE UP (ref 135–145)
WBC # BLD: 11.56 K/UL — HIGH (ref 3.8–10.5)
WBC # FLD AUTO: 11.56 K/UL — HIGH (ref 3.8–10.5)

## 2019-11-03 PROCEDURE — 99239 HOSP IP/OBS DSCHRG MGMT >30: CPT

## 2019-11-03 PROCEDURE — 99233 SBSQ HOSP IP/OBS HIGH 50: CPT

## 2019-11-03 RX ORDER — POTASSIUM CHLORIDE 20 MEQ
40 PACKET (EA) ORAL ONCE
Refills: 0 | Status: COMPLETED | OUTPATIENT
Start: 2019-11-03 | End: 2019-11-03

## 2019-11-03 RX ORDER — POTASSIUM CHLORIDE 20 MEQ
1 PACKET (EA) ORAL
Qty: 0 | Refills: 0 | DISCHARGE
Start: 2019-11-03

## 2019-11-03 RX ORDER — FUROSEMIDE 40 MG
1 TABLET ORAL
Qty: 0 | Refills: 0 | DISCHARGE
Start: 2019-11-03

## 2019-11-03 RX ORDER — FUROSEMIDE 40 MG
20 TABLET ORAL DAILY
Refills: 0 | Status: DISCONTINUED | OUTPATIENT
Start: 2019-11-03 | End: 2019-11-03

## 2019-11-03 RX ORDER — ASPIRIN/CALCIUM CARB/MAGNESIUM 324 MG
1 TABLET ORAL
Qty: 0 | Refills: 0 | DISCHARGE
Start: 2019-11-03

## 2019-11-03 RX ADMIN — Medication 10 MILLIGRAM(S): at 05:37

## 2019-11-03 RX ADMIN — AMLODIPINE BESYLATE 5 MILLIGRAM(S): 2.5 TABLET ORAL at 11:13

## 2019-11-03 RX ADMIN — APIXABAN 5 MILLIGRAM(S): 2.5 TABLET, FILM COATED ORAL at 05:37

## 2019-11-03 RX ADMIN — Medication 10 MILLIEQUIVALENT(S): at 11:14

## 2019-11-03 RX ADMIN — CLOPIDOGREL BISULFATE 75 MILLIGRAM(S): 75 TABLET, FILM COATED ORAL at 11:13

## 2019-11-03 RX ADMIN — Medication 25 MILLIGRAM(S): at 17:08

## 2019-11-03 RX ADMIN — Medication 12.5 MILLIGRAM(S): at 05:37

## 2019-11-03 RX ADMIN — Medication 81 MILLIGRAM(S): at 11:13

## 2019-11-03 RX ADMIN — METHOTREXATE 15 MILLIGRAM(S): 2.5 TABLET ORAL at 11:12

## 2019-11-03 RX ADMIN — APIXABAN 5 MILLIGRAM(S): 2.5 TABLET, FILM COATED ORAL at 17:08

## 2019-11-03 RX ADMIN — Medication 25 MILLIGRAM(S): at 05:37

## 2019-11-03 RX ADMIN — Medication 40 MILLIEQUIVALENT(S): at 09:09

## 2019-11-03 RX ADMIN — Medication 50 MICROGRAM(S): at 05:37

## 2019-11-03 RX ADMIN — Medication 40 MILLIGRAM(S): at 05:36

## 2019-11-03 RX ADMIN — Medication 1 MILLIGRAM(S): at 11:13

## 2019-11-03 NOTE — PROGRESS NOTE ADULT - SUBJECTIVE AND OBJECTIVE BOX
INTERVAL HPI:  79 year old female with Rheumatoid arthritis, HTN, A fib on Eliquis,  RLE stent(for PAD) on Plavix, Leg swelling(was on diuretics which she stoped), Right  Breast CA S/P Lumpectomy +Leidy dissection followed by Chem and radiation about 15 years ago.  Woke up from sleep with mid sternal  chest pain(pressure like), had radiation to bilateral lower chest,  up to neck and associated with SOB. Reports with pleuritic component, had episode of vomiting after arrival but denies sweating  No URI, fever, chills, cough or sputum production.  In the ED, EKG shows Afib, BNP 1878, troponin neg x 1, lactate normal, flu normal, CTA chest shows b/l dependent atelectasis, 1.6 cm ground-glass opacity, no PE.  75 pack year smoking history , quit 29 years ago.    OVERNIGHT EVENTS:  Awake, comfortable and feels better.    Vital Signs Last 24 Hrs  T(C): 36.3 (03 Nov 2019 04:59), Max: 37.8 (02 Nov 2019 17:25)  T(F): 97.3 (03 Nov 2019 04:59), Max: 100 (02 Nov 2019 17:25)  HR: 72 (03 Nov 2019 04:59) (68 - 84)  BP: 119/67 (03 Nov 2019 04:59) (97/60 - 119/67)  BP(mean): --  RR: 18 (03 Nov 2019 04:59) (16 - 18)  SpO2: 97% (03 Nov 2019 04:59) (87% - 97%)    PHYSICAL EXAM:  GEN:         Awake, responsive and comfortable.  HEENT:    Normal.    RESP:       no wheezing.  CVS:           Regular rate and rhythm.   ABD:         Soft, non-tender, non-distended;     MEDICATIONS  (STANDING):  amLODIPine   Tablet 5 milliGRAM(s) Oral daily  apixaban 5 milliGRAM(s) Oral every 12 hours  aspirin enteric coated 81 milliGRAM(s) Oral daily  atorvastatin 10 milliGRAM(s) Oral at bedtime  clopidogrel Tablet 75 milliGRAM(s) Oral daily  folic acid 1 milliGRAM(s) Oral daily  furosemide    Tablet 20 milliGRAM(s) Oral daily  hydrochlorothiazide 12.5 milliGRAM(s) Oral daily  levothyroxine 50 MICROGram(s) Oral daily  methotrexate 15 milliGRAM(s) Oral every week  metoprolol tartrate 25 milliGRAM(s) Oral two times a day  potassium chloride    Tablet ER 10 milliEquivalent(s) Oral daily  predniSONE   Tablet 10 milliGRAM(s) Oral daily    MEDICATIONS  (PRN):  acetaminophen   Tablet .. 650 milliGRAM(s) Oral every 6 hours PRN Temp greater or equal to 38C (100.4F), Mild Pain (1 - 3)    LABS:                        11.1   11.56 )-----------( 272      ( 03 Nov 2019 07:35 )             33.8     11-03    139  |  102  |  15  ----------------------------<  106<H>  3.4<L>   |  29  |  1.05    Ca    9.2      03 Nov 2019 07:35    11-01 @ 08:01  pH: 7.48  pCO2: 36  pO2: 82  SaO2: 96    ASSESSMENT AND PLAN:  ·	SOB.  ·	Chest pain, likely Angina.  ·	Suspect Diastolic Dysfunction.  ·	Chronic A Fib.  ·	PAD with RL extremity stent  ·	HTN  ·	Anemia.  ·	Hypokalemia.  ·	Rheumatoid Arthritis.  ·	Right Breast CA S/P Lumpectomy S/P Chemo+ Radiation.    Clinically better.  Continue diuretics.

## 2019-11-03 NOTE — DISCHARGE NOTE NURSING/CASE MANAGEMENT/SOCIAL WORK - PATIENT PORTAL LINK FT
You can access the FollowMyHealth Patient Portal offered by [facility] by registering at the following website: [email or web address]. By joining [employer]’s FollowMyHealth portal, you will also be able to view your health information using other applications (apps) compatible with our system.

## 2019-11-03 NOTE — DISCHARGE NOTE PROVIDER - HOSPITAL COURSE
79 year old female with PMH rheumatoid arthritis, HTN, afib on eliquis, RLE stent on plavix, Breast ca presents to ED for evaluation of shortness of breath with chest pressure.  She states that she has felt fine until last night when she suddenly felt nauseated, "weak all over", and developed substernal chest pain.  Pain radiated up b/l neck and to b/l shoulders.  Pain is both pleuritic and occurs at rest as well as with movement of UE.  She denies palpitations, cough, fever, chills.  Strong history of CAD on father's side (several deaths in 30s).        In the ED, EKG shows Afib, BNP 1878, troponin neg x 1, lactate normal, flu normal, CTA chest shows b/l dependent atelectasis, 1.6 cm groundglass opacity, no PE.        Hospital course:    Pt stated that her PMD had started her on Lasix as needed for LE edema; she had not been taking it.    Pt started on Lasix IVP with some improvement in SOB but not in pain.    Cardiac enzymes neg x 3, No events on telemetry, TTE done (See results below).  Some element of HF is suspected; possibly diastolic.    Pt started on low-dose prednisone for suspected RA flare; she had marked improvement     in pain after starting prednisone.  After 2 days of IV Lasix there was marked improvement     in SOB.    Pulmonary and cardiology consulted; chest pain is very atypical for angina however given overall clinical picture and history recommendation from cardiologist is for outpatient stress test.    Pt stable for discharge to home with outpatient follow up.  She will see her PMD and cardiologist and is advised to be scheduled for stress test.  Advised to re-start Lasix PO.        TTE:    (11/1/19):    Summary:     1. Left ventricular ejection fraction, by visual estimation, is 55 to     60%.     2. Technically fair study.     3. Normal global left ventricular systolic function.     4. Normal left ventricular internal cavity size.     5. The mitral in-flow pattern reveals no discernable A-wave, therefore     no comment on diastolic function can be made.     6. Normal right ventricular size and function.     7. There is no evidence of pericardial effusion.     8. Mild mitral valve regurgitation.     9. Mild thickening and calcification of the anterior and posterior     mitral valve leaflets.    10. Estimated pulmonary artery systolic pressure is 45.2 mmHg assuming a     right atrial pressure of 10 mmHg, which is consistent with mild pulmonary     hypertension.

## 2019-11-03 NOTE — DISCHARGE NOTE PROVIDER - NSDCCPCAREPLAN_GEN_ALL_CORE_FT
PRINCIPAL DISCHARGE DIAGNOSIS  Diagnosis: Chest pain  Assessment and Plan of Treatment: Likely RA flare; finish prednisone and follow with PMD and cardiologist for outpatient stress test.

## 2019-11-03 NOTE — PROGRESS NOTE ADULT - SUBJECTIVE AND OBJECTIVE BOX
CHIEF COMPLAINT:  Patient is a 79y old  Female who presents with a chief complaint of Chest pain, SOB (01 Nov 2019 08:18)      HPI:  79 year old female with rheumatoid arthritis, HTN, afib on eliquis, RLE stent on plavix, Breast ca presented with shortness of breath with chest pressure.  She states that she has felt fine until last night when she suddenly felt nauseated, "weak all over", and developed substernal chest pain.  Pain radiated up b/l neck and to shoulders.  Pain is both pleuritic and occurs at rest.  She denies palpitations, cough, fever, chills.  Strong history of CAD on father's side (several deaths in 30s). EKG shows Afib, BNP 1878, troponin neg x 1, lactate normal, flu normal, CTA chest shows b/l dependent atelectasis, 1.6 cm groundglass opacity, no PE. No further cp/sob. Feels better.      REVIEW OF SYSTEMS:  General:  No wt loss, fevers, chills, night sweats  Eyes:  Good vision, no reported pain  ENT:  No sore throat, pain, runny nose, dysphagia  CV:  No pain, palpitations, hypo/hypertension  Resp:  No dyspnea, cough, tachypnea, wheezing  GI:  No pain, nausea, vomiting, diarrhea, constipation  :  No pain, bleeding, incontinence, nocturia  Muscle:  No pain, weakness  Breast:  No pain, abscess, mass, discharge  Neuro:  No weakness, tingling, memory problems  Psych:  No fatigue, insomnia, mood problems, depression  Endocrine:  No polyuria, polydipsia, cold/heat intolerance  Heme:  No petechiae, ecchymosis, easy bruisability  Skin:  No rash, edema    PHYSICAL EXAM:  Vital Signs Last 24 Hrs  T(C): 36.3 (03 Nov 2019 04:59), Max: 37.8 (02 Nov 2019 17:25)  T(F): 97.3 (03 Nov 2019 04:59), Max: 100 (02 Nov 2019 17:25)  HR: 72 (03 Nov 2019 04:59) (68 - 84)  BP: 119/67 (03 Nov 2019 04:59) (97/60 - 119/67)  RR: 18 (03 Nov 2019 04:59) (16 - 18)  SpO2: 97% (03 Nov 2019 04:59) (87% - 97%)    Tele: AFib    Constitutional: well developed, normal appearance, well groomed, well nourished, no deformities and no acute distress.   Eyes: the conjunctiva exhibited no abnormalities and the eyelids demonstrated no xanthelasmas.   HEENT: normal oral mucosa, no oral pallor and no oral cyanosis.   Neck: normal jugular venous A waves present, normal jugular venous V waves present and no jugular venous fine A waves.   Pulmonary: no respiratory distress, normal respiratory rhythm and effort, no accessory muscle use and lungs were clear to auscultation bilaterally.   Cardiovascular: irregular heart rate and rhythm were normal, normal S1 and S2 and no murmur, gallop, rub, heave or thrill are present.   Abdomen: soft, non-tender, no hepato-splenomegaly and no abdominal mass palpated.   Musculoskeletal: the gait could not be assessed.   Extremities: no clubbing of the fingernails, no localized cyanosis, no petechial hemorrhages and no ischemic changes. +1 b/l lower leg edema.  Skin: normal skin color and pigmentation, no rash, no venous stasis, no skin lesions, no skin ulcer and no xanthoma was observed.   Psychiatric: oriented to person, place, and time, the affect was normal, the mood was normal and not feeling anxious.      LABORATORY:                                              11.1   11.56 )-----------( 272      ( 03 Nov 2019 07:35 )             33.8       11-03    139  |  102  |  15  ----------------------------<  106<H>  3.4<L>   |  29  |  1.05    Ca    9.2      03 Nov 2019 07:35                 BNPSerum Pro-Brain Natriuretic Peptide: 1878 pg/mL (11-01-19 @ 08:11)      IMAGING:  < from: CT Angio Chest w/ IV Cont (11.01.19 @ 06:37) >  No evidence of pulmonary embolism.  There is a 1.6 cm groundglass nodule in the right upper lobe. This may be   followed up with chest CT in 4-6 weeks.  Cardiomegaly.    < end of copied text >    Summary:   1. Left ventricular ejection fraction, by visual estimation, is 55 to   60%.   2. Technically fair study.   3. Normal global left ventricular systolic function.   4. Normal left ventricular internal cavity size.   5. The mitral in-flow pattern reveals no discernable A-wave, therefore   no comment on diastolic function can be made.   6. Normal right ventricular size and function.   7. There is no evidence of pericardial effusion.   8. Mild mitral valve regurgitation.   9. Mild thickening and calcification of the anterior and posterior   mitral valve leaflets.  10. Estimated pulmonary artery systolic pressure is 45.2 mmHg assuming a   right atrial pressure of 10 mmHg, which is consistent with mild pulmonary   hypertension.    ASSESSMENT:  79 year old female with PMH rheumatoid arthritis, HTN, afib on eliquis, RLE stent on plavix, Breast ca presented with shortness of breath with chest pressure.  She states that she has felt fine until last night when she suddenly felt nauseated, "weak all over", and developed substernal chest pain.  Pain radiated up b/l neck and to shoulders.  Pain is both pleuritic and occurs at rest.  She denies palpitations, cough, fever, chills.  Strong history of CAD on father's side (several deaths in 30s). EKG shows Afib, BNP 1878, troponin neg x 1, lactate normal, flu normal, CTA chest shows b/l dependent atelectasis, 1.6 cm groundglass opacity, no PE. No cp/sob now. Element of acute on chronic HFpEF.    PLAN:     Tele monitoring.    MEDICATIONS  (STANDING):  amLODIPine   Tablet 5 milliGRAM(s) Oral daily  apixaban 5 milliGRAM(s) Oral every 12 hours  aspirin enteric coated 81 milliGRAM(s) Oral daily  atorvastatin 10 milliGRAM(s) Oral at bedtime  clopidogrel Tablet 75 milliGRAM(s) Oral daily  folic acid 1 milliGRAM(s) Oral daily  furosemide   Injectable 40 milliGRAM(s) IV Push daily  hydrochlorothiazide 12.5 milliGRAM(s) Oral daily  levothyroxine 50 MICROGram(s) Oral daily  methotrexate 15 milliGRAM(s) Oral every week  metoprolol tartrate 25 milliGRAM(s) Oral two times a day  potassium chloride    Tablet ER 10 milliEquivalent(s) Oral daily  predniSONE   Tablet 10 milliGRAM(s) Oral daily    can change to lasix 20 mg po daily.  outpt stress testing and cardiac f/u is ok.    Rian Daniel MD, FACC, FASE, FASNC, FACP  Director, Heart Failure Services  [facility]  , Department of Cardiology  [facility]

## 2019-11-03 NOTE — DISCHARGE NOTE PROVIDER - NSDCMRMEDTOKEN_GEN_ALL_CORE_FT
amLODIPine 5 mg oral tablet: 1 tab(s) orally once a day  atorvastatin 10 mg oral tablet: 1 tab(s) orally once a day  Govind Low Dose 81 mg oral delayed release tablet: 1 tab(s) orally once a day  clopidogrel 75 mg oral tablet: 1 tab(s) orally once a day  Eliquis 5 mg oral tablet: 1 tab(s) orally 2 times a day  folic acid 1 mg oral tablet: 1 tab(s) orally once a day  furosemide 20 mg oral tablet: 1 tab(s) orally once a day  hydroCHLOROthiazide 12.5 mg oral tablet: 1 tab(s) orally once a day  levothyroxine 50 mcg (0.05 mg) oral tablet: 1 tab(s) orally once a day  methotrexate 2.5 mg oral tablet:   metoprolol tartrate 25 mg oral tablet: 1 tab(s) orally 2 times a day  potassium chloride 10 mEq oral tablet, extended release: 1 tab(s) orally once a day  predniSONE 10 mg oral tablet: 1 tab(s) orally once a day

## 2019-11-06 LAB
CULTURE RESULTS: SIGNIFICANT CHANGE UP
CULTURE RESULTS: SIGNIFICANT CHANGE UP
SPECIMEN SOURCE: SIGNIFICANT CHANGE UP
SPECIMEN SOURCE: SIGNIFICANT CHANGE UP

## 2019-11-07 DIAGNOSIS — R73.03 PREDIABETES: ICD-10-CM

## 2019-11-07 DIAGNOSIS — I27.20 PULMONARY HYPERTENSION, UNSPECIFIED: ICD-10-CM

## 2019-11-07 DIAGNOSIS — Z79.02 LONG TERM (CURRENT) USE OF ANTITHROMBOTICS/ANTIPLATELETS: ICD-10-CM

## 2019-11-07 DIAGNOSIS — I50.33 ACUTE ON CHRONIC DIASTOLIC (CONGESTIVE) HEART FAILURE: ICD-10-CM

## 2019-11-07 DIAGNOSIS — E03.9 HYPOTHYROIDISM, UNSPECIFIED: ICD-10-CM

## 2019-11-07 DIAGNOSIS — I11.0 HYPERTENSIVE HEART DISEASE WITH HEART FAILURE: ICD-10-CM

## 2019-11-07 DIAGNOSIS — I48.20 CHRONIC ATRIAL FIBRILLATION, UNSPECIFIED: ICD-10-CM

## 2019-11-07 DIAGNOSIS — D64.9 ANEMIA, UNSPECIFIED: ICD-10-CM

## 2019-11-07 DIAGNOSIS — E87.6 HYPOKALEMIA: ICD-10-CM

## 2019-11-07 DIAGNOSIS — Z92.21 PERSONAL HISTORY OF ANTINEOPLASTIC CHEMOTHERAPY: ICD-10-CM

## 2019-11-07 DIAGNOSIS — I20.9 ANGINA PECTORIS, UNSPECIFIED: ICD-10-CM

## 2019-11-07 DIAGNOSIS — J98.11 ATELECTASIS: ICD-10-CM

## 2019-11-07 DIAGNOSIS — R07.9 CHEST PAIN, UNSPECIFIED: ICD-10-CM

## 2019-11-07 DIAGNOSIS — M06.9 RHEUMATOID ARTHRITIS, UNSPECIFIED: ICD-10-CM

## 2019-11-07 DIAGNOSIS — Z87.891 PERSONAL HISTORY OF NICOTINE DEPENDENCE: ICD-10-CM

## 2019-11-07 LAB — ANA TITR SER: NEGATIVE — SIGNIFICANT CHANGE UP

## 2019-11-18 ENCOUNTER — MEDICATION RENEWAL (OUTPATIENT)
Age: 79
End: 2019-11-18

## 2020-01-02 ENCOUNTER — APPOINTMENT (OUTPATIENT)
Dept: RHEUMATOLOGY | Facility: CLINIC | Age: 80
End: 2020-01-02
Payer: MEDICARE

## 2020-01-02 ENCOUNTER — LABORATORY RESULT (OUTPATIENT)
Age: 80
End: 2020-01-02

## 2020-01-02 VITALS
BODY MASS INDEX: 26.33 KG/M2 | TEMPERATURE: 98.7 F | RESPIRATION RATE: 16 BRPM | SYSTOLIC BLOOD PRESSURE: 128 MMHG | WEIGHT: 158 LBS | DIASTOLIC BLOOD PRESSURE: 78 MMHG | OXYGEN SATURATION: 96 % | HEIGHT: 65 IN | HEART RATE: 60 BPM

## 2020-01-02 PROBLEM — I10 ESSENTIAL (PRIMARY) HYPERTENSION: Chronic | Status: ACTIVE | Noted: 2019-11-01

## 2020-01-02 PROBLEM — I48.91 UNSPECIFIED ATRIAL FIBRILLATION: Chronic | Status: ACTIVE | Noted: 2019-11-01

## 2020-01-02 PROCEDURE — 99214 OFFICE O/P EST MOD 30 MIN: CPT

## 2020-01-02 NOTE — ASSESSMENT
[FreeTextEntry1] : 1.  RA: chronic erosive and deforming disease, which has been treated with a variety of medicines.  Most recently Rituxan (last dose 10/22/2012) with a nice response.   Currently with pain in the right wrist as well as the the right ankle (same as previous visits). She is tolerating her medicines.  Her other joints are stable.  She feels that her disease is stable at the moment.  However, the damaged ankle/foot is responsible for increasing pain and the use of a cane.  She doesn't want at this point to think about surgery. In the fall 2016 she developed polyarticular pain and desired additional medicines. This flare responded to low dose prednisone, which she took for many weeks and then tapered off. She navigated a long trip through [place] and did quite well. Since that time she has traveled several times without incident.\par 2.  Chronic methotrexate use: tolerating without dyspnea, GI symptoms, infection\par 3.  Rituxan exposure: no infections or fevers\par 4.  ILD: no dyspnea\par 5.  Hypothyroidism\par 6.  Osteoporosis: had BMD (spine: osteopenia but values falsely elevated; hip could not be studied secondary to THR; wrist was osteoporotic-but this was a site a major involvement secondary to RA).  She was previously on Fosamax, but this was stopped because of a gastric or esophageal bleed. She received zoledronic acid 2015 through 2018.  Will hold off for now on additional zoledronic acid. \par 7.  Vitamin D deficiency\par 8.  Possible bursitis:  History of two falls (hit left hip and face-s/p stitches).  X-rays per patient x 2 negative.  Pain on L lateral thigh.  No bruising.\par 9.  Clavicular fracture: fell during a trip in the summer 2014 and fractured her clavicle.  She received care in [place] and has engaged in physical therapy. \par 10.  Right ankle arthritis: obtained new orthotic for the right foot.  Pain has increased, and she has resorted to the use of a cane. \par 11.  Rotator cuff tear:  left.  Occurred in early 2016.  An X-ray and MRI were performed.  Therapy at first was restricted to physical therapy.  However, she underwent surgery, which she considered to be quite successful. \par 12.  Neck pain:  onset in early June 2017 without radicular or myelopathic symptoms. No trauma. \par 13.  URI's: onset in mid-Dec 2017 and again in April 2018\par 14.  Breast cancer: in 2005\par 15.  Right knee pain:  two episodes of self-limited right knee pain in the summer 2018 without a history of trauma.\par 16.  Shingles: episode in the fall 2019 affecting the left flank.  She still refuses vaccination. \par 17.  Foot surgery: surgery planned on the right foot on March 3, 2020 at [facility]. \par \par Plan:\par 1.  Lab tests\par 2.  Return 3 months\par 3.  Hold off on further zoledronic acid for now\par 4.  Favor Shingrix\par \par \par \par

## 2020-01-02 NOTE — HISTORY OF PRESENT ILLNESS
[FreeTextEntry1] : 1.  RA: chronic erosive and deforming disease, which has been treated with a variety of medicines.  Most recently Rituxan (last dose 10/22/2012) with a nice response.   Currently with pain in the right wrist as well as the the right ankle (same as previous visits). She is tolerating her medicines.  Her other joints are stable.  She feels that her disease is stable at the moment.  However, the damaged ankle/foot is responsible for increasing pain and the use of a cane.  She doesn't want at this point to think about surgery. In the fall 2016 she developed polyarticular pain and desired additional medicines. This flare responded to low dose prednisone, which she took for many weeks and then tapered off. She navigated a long trip through [place] and did quite well. Since that time she has traveled several times without incident.\par 2.  Chronic methotrexate use: tolerating without dyspnea, GI symptoms, infection\par 3.  Rituxan exposure: no infections or fevers\par 4.  ILD: no dyspnea\par 5.  Hypothyroidism\par 6.  Osteoporosis: had BMD (spine: osteopenia but values falsely elevated; hip could not be studied secondary to THR; wrist was osteoporotic-but this was a site a major involvement secondary to RA).  She was previously on Fosamax, but this was stopped because of a gastric or esophageal bleed. She received zoledronic acid 2015 through 2018.  Will hold off for now on additional zoledronic acid. \par 7.  Vitamin D deficiency\par 8.  Possible bursitis:  History of two falls (hit left hip and face-s/p stitches).  X-rays per patient x 2 negative.  Pain on L lateral thigh.  No bruising.\par 9.  Clavicular fracture: fell during a trip in the summer 2014 and fractured her clavicle.  She received care in [place] and has engaged in physical therapy. \par 10.  Right ankle arthritis: obtained new orthotic for the right foot.  Pain has increased, and she has resorted to the use of a cane. \par 11.  Rotator cuff tear:  left.  Occurred in early 2016.  An X-ray and MRI were performed.  Therapy at first was restricted to physical therapy.  However, she underwent surgery, which she considered to be quite successful. \par 12.  Neck pain:  onset in early June 2017 without radicular or myelopathic symptoms. No trauma. \par 13.  URI's: onset in mid-Dec 2017 and again in April 2018\par 14.  Breast cancer: in 2005\par 15.  Right knee pain:  two episodes of self-limited right knee pain in the summer 2018 without a history of trauma.\par 16.  Shingles: episode in the fall 2019 affecting the left flank.  She still refuses vaccination. \par 17.  Foot surgery: surgery planned on the right foot on March 3, 2020 at [facility]. \par \par Meds\par 1.  Methotrexate 15 mg po weekly\par 2.  Atenolol 25 mg po daily\par 3.  Synthroid 0.05 mg po daily\par 4.  Folate 1 mg po daily\par 5.  Norvasc 5 mg po daily\par 6.  Hydrochlorothiazide 12.5 mg po daily\par 7.  Ca/D\par 8.  Vitamin D 1xd (dose unknown)\par 9.  ASA 81 mg/d (patient stopped)\par 10.  Eliquis \par 11.  Zoledronic acid (last September 2018)\par \par Vaccines\par PNVX  1/12/2010\par Prevnar 13 valent 2/9/15 ([id number]; exp 5/16)\par PNVX 23  12/5/16  ([id number]; exp 11Jan2018)\par Flu vaccine received elsewhere in 2014\par Quadrivalent fluzone 11/23/15  ( AA; exp 6/16)\par Fluzone 9/6/16  ([id number]; exp 17MAR17)\par Fluzone HD  9/14/17  ( AA; exp 6APR2018)\par Fluzone HD 11/12/18  ( AA; exp 29APR2019)

## 2020-01-02 NOTE — DISCUSSION/SUMMARY
[FreeTextEntry1] : 1.  RA: chronic erosive and deforming disease, which has been treated with a variety of medicines.  Most recently Rituxan (last dose 10/22/2012) with a nice response.   Currently with pain in the right wrist as well as the the right ankle (same as previous visits). She is tolerating her medicines.  Her other joints are stable.  She feels that her disease is stable at the moment.\par 2.  Chronic methotrexate use: tolerating without dyspnea, GI symptoms, infection\par 3.  Rituxan exposure: no infections or fevers\par 4.  ILD: no dyspnea\par 5.  Hypothyroidism\par 6.  Osteoporosis: had BMD (spine: osteopenia but values falsely elevated; hip could not be studied secondary to THR; wrist was osteoporotic-but this was a site a major involvement secondary to RA).  She was previously on Fosamax, but this was stopped because of a gastric or esophageal bleed. \par 7.  Vitamin D deficiency\par 8.  Possible bursitis:  History of two falls (hit left hip and face-s/p stitches).  X-rays per patient x 2 negative.  Pain on L lateral thigh.  No bruising.\par 9.  Clavicular fracture: fell during a trip in the summer 2014 and fractured her clavicle.  She received care in [place] and has engaged in physical therapy. \par 10.  Right ankle arthritis: obtained new orthotic for the right foot.  It is too early to determine its effectiveness.\par 11.  Peripheral vascular disease:  underwent stenting in the right LE.\par \par Plan\par 1.  Continue MTX 15 mg weekly\par 2.  Labs today\par 3.  Consider repeat Rituxan; however, I would prefer to wait until there is a significant flare\par 4. Return 3 months

## 2020-01-03 LAB
25(OH)D3 SERPL-MCNC: 40.3 NG/ML
ALBUMIN SERPL ELPH-MCNC: 4.1 G/DL
ALP BLD-CCNC: 71 U/L
ALT SERPL-CCNC: 22 U/L
ANION GAP SERPL CALC-SCNC: 13 MMOL/L
AST SERPL-CCNC: 15 U/L
BASOPHILS # BLD AUTO: 0.04 K/UL
BASOPHILS NFR BLD AUTO: 0.3 %
BILIRUB SERPL-MCNC: 0.8 MG/DL
BUN SERPL-MCNC: 21 MG/DL
CALCIUM SERPL-MCNC: 9.3 MG/DL
CHLORIDE SERPL-SCNC: 101 MMOL/L
CO2 SERPL-SCNC: 29 MMOL/L
CREAT SERPL-MCNC: 1.03 MG/DL
CRP SERPL-MCNC: 0.64 MG/DL
EOSINOPHIL # BLD AUTO: 0.13 K/UL
EOSINOPHIL NFR BLD AUTO: 0.8 %
GLUCOSE SERPL-MCNC: 86 MG/DL
HCT VFR BLD CALC: 39.8 %
HGB BLD-MCNC: 12.4 G/DL
IMM GRANULOCYTES NFR BLD AUTO: 0.6 %
LYMPHOCYTES # BLD AUTO: 1.02 K/UL
LYMPHOCYTES NFR BLD AUTO: 6.6 %
MAN DIFF?: NORMAL
MCHC RBC-ENTMCNC: 31.2 GM/DL
MCHC RBC-ENTMCNC: 33.3 PG
MCV RBC AUTO: 107 FL
MONOCYTES # BLD AUTO: 0.68 K/UL
MONOCYTES NFR BLD AUTO: 4.4 %
NEUTROPHILS # BLD AUTO: 13.59 K/UL
NEUTROPHILS NFR BLD AUTO: 87.3 %
PLATELET # BLD AUTO: 239 K/UL
POTASSIUM SERPL-SCNC: 3.6 MMOL/L
PROT SERPL-MCNC: 6.3 G/DL
RBC # BLD: 3.72 M/UL
RBC # FLD: 20 %
SODIUM SERPL-SCNC: 143 MMOL/L
WBC # FLD AUTO: 15.56 K/UL

## 2020-02-13 ENCOUNTER — RX RENEWAL (OUTPATIENT)
Age: 80
End: 2020-02-13

## 2020-03-18 NOTE — PATIENT PROFILE ADULT - INFORMATION PROVIDED TO:
Pt called stating right hip is in severe pain and she doesn't have a pulse in the foot.  Fwd to nurse [facility] caregiver

## 2020-04-28 ENCOUNTER — APPOINTMENT (OUTPATIENT)
Dept: RHEUMATOLOGY | Facility: CLINIC | Age: 80
End: 2020-04-28

## 2020-05-13 ENCOUNTER — RX RENEWAL (OUTPATIENT)
Age: 80
End: 2020-05-13

## 2020-11-20 NOTE — ED ADULT NURSE NOTE - PAIN RATING/NUMBER SCALE (0-10): ACTIVITY
Subjective   Azar Lo is a 55 y.o. male. Patient is here today for follow-up on his hypertension, hyperlipidemia, vitamin D deficiency, obesity and hyperglycemia.  He is generally feeling well and has no acute complaints  Chief Complaint   Patient presents with   • Hypertension     HLD- FOLLOW UP LABS          Vitals:    11/20/20 0916   BP: 130/80   Pulse: 66   Resp: 16   Temp: 97.8 °F (36.6 °C)   SpO2: 100%     Body mass index is 38.87 kg/m².  The following portions of the patient's history were reviewed and updated as appropriate: allergies, current medications, past family history, past medical history, past social history, past surgical history and problem list.    Past Medical History:   Diagnosis Date   • Arthritis    • Seasonal allergies    • Sleep apnea     CPAP      No Known Allergies   Social History     Socioeconomic History   • Marital status: Single     Spouse name: Not on file   • Number of children: Not on file   • Years of education: Not on file   • Highest education level: Not on file   Tobacco Use   • Smoking status: Never Smoker   • Smokeless tobacco: Never Used   Substance and Sexual Activity   • Alcohol use: Yes     Alcohol/week: 2.0 standard drinks     Types: 2 Cans of beer per week   • Drug use: No   • Sexual activity: Defer        Current Outpatient Medications:   •  acetaminophen (TYLENOL) 500 MG tablet, Take 1,000 mg by mouth Every 6 (Six) Hours As Needed for Mild Pain ., Disp: , Rfl:   •  diclofenac (VOLTAREN) 75 MG EC tablet, Take 1 tablet by mouth 2 (Two) Times a Day., Disp: 180 tablet, Rfl: 3  •  docusate sodium (COLACE) 100 MG capsule, Take 1 capsule by mouth 2 (Two) Times a Day., Disp: 60 capsule, Rfl: 3  •  Fluticasone Furoate (FLONASE SENSIMIST NA), 1 spray into the nostril(s) as directed by provider Daily., Disp: , Rfl:   •  lisinopril (PRINIVIL,ZESTRIL) 20 MG tablet, Take 1 tablet by mouth Daily., Disp: 90 tablet, Rfl: 2  •  atorvastatin (LIPITOR) 10 MG tablet, Take 1  tablet by mouth Daily., Disp: 90 tablet, Rfl: 2     Objective     History of Present Illness     Review of Systems   Constitutional: Negative.    HENT: Negative.    Respiratory: Negative.    Cardiovascular: Negative.    Gastrointestinal: Negative.    Genitourinary: Negative.    Musculoskeletal: Negative.    Skin: Negative.    Neurological: Negative.    Psychiatric/Behavioral: Negative.        Physical Exam  Vitals signs and nursing note reviewed.   Constitutional:       General: He is not in acute distress.     Appearance: Normal appearance. He is obese. He is not ill-appearing.   HENT:      Head: Normocephalic and atraumatic.   Eyes:      General: No scleral icterus.     Conjunctiva/sclera: Conjunctivae normal.   Neck:      Musculoskeletal: Normal range of motion and neck supple.   Cardiovascular:      Rate and Rhythm: Normal rate and regular rhythm.      Heart sounds: Normal heart sounds.   Pulmonary:      Effort: Pulmonary effort is normal. No respiratory distress.      Breath sounds: Normal breath sounds. No wheezing, rhonchi or rales.   Musculoskeletal: Normal range of motion.   Skin:     General: Skin is warm and dry.   Neurological:      General: No focal deficit present.      Mental Status: He is alert and oriented to person, place, and time.   Psychiatric:         Mood and Affect: Mood normal.         Behavior: Behavior normal.         ASSESSMENT CBC, PSA and TSH were completely normal.  CMP has an elevated sugar of 123 and was otherwise normal and hemoglobin A1c remains normal at 5.5.  Lipid panel is elevated with total cholesterol of 229, triglycerides 283, slightly low HDL of 36 and   #1-hypertension, controlled on medicine  #2-hyperlipidemia, asymptomatic and not controlled by diet  #3-vitamin D deficiency, asymptomatic  #4-obesity, stable  #5-hyperglycemia, asymptomatic and stable with normal hemoglobin A1c, diet control     Problems Addressed this Visit        Cardiovascular and Mediastinum     Hyperlipidemia - Primary    Relevant Medications    atorvastatin (LIPITOR) 10 MG tablet    Essential hypertension       Digestive    Obesity due to excess calories    Vitamin D deficiency       Other    Hyperglycemia      Diagnoses       Codes Comments    Hyperlipidemia, unspecified hyperlipidemia type    -  Primary ICD-10-CM: E78.5  ICD-9-CM: 272.4     Essential hypertension     ICD-10-CM: I10  ICD-9-CM: 401.9     Vitamin D deficiency     ICD-10-CM: E55.9  ICD-9-CM: 268.9     Class 2 obesity due to excess calories without serious comorbidity with body mass index (BMI) of 38.0 to 38.9 in adult     ICD-10-CM: E66.09, Z68.38  ICD-9-CM: 278.00, V85.38     Hyperglycemia     ICD-10-CM: R73.9  ICD-9-CM: 790.29           PLAN I recommended the patient continue to try and lose weight, watch dietary fats and carbs.  I am starting him on atorvastatin 10 mg daily.  He will continue other medicines as now and I would like to recheck him in 6 months with a CMP, lipid panel, hemoglobin A1c    There are no Patient Instructions on file for this visit.  Return in about 6 months (around 5/20/2021) for with labs.   6

## 2021-01-26 ENCOUNTER — RX RENEWAL (OUTPATIENT)
Age: 81
End: 2021-01-26

## 2021-01-27 ENCOUNTER — RX RENEWAL (OUTPATIENT)
Age: 81
End: 2021-01-27

## 2021-02-08 ENCOUNTER — RX RENEWAL (OUTPATIENT)
Age: 81
End: 2021-02-08

## 2021-04-26 ENCOUNTER — RX RENEWAL (OUTPATIENT)
Age: 81
End: 2021-04-26

## 2021-05-07 ENCOUNTER — RX RENEWAL (OUTPATIENT)
Age: 81
End: 2021-05-07

## 2021-06-28 ENCOUNTER — APPOINTMENT (OUTPATIENT)
Dept: RHEUMATOLOGY | Facility: CLINIC | Age: 81
End: 2021-06-28
Payer: MEDICARE

## 2021-06-28 ENCOUNTER — APPOINTMENT (OUTPATIENT)
Dept: ENDOCRINOLOGY | Facility: CLINIC | Age: 81
End: 2021-06-28
Payer: MEDICARE

## 2021-06-28 VITALS
OXYGEN SATURATION: 97 % | DIASTOLIC BLOOD PRESSURE: 72 MMHG | SYSTOLIC BLOOD PRESSURE: 124 MMHG | BODY MASS INDEX: 26.16 KG/M2 | TEMPERATURE: 98 F | HEIGHT: 65 IN | HEART RATE: 69 BPM | RESPIRATION RATE: 16 BRPM | WEIGHT: 157 LBS

## 2021-06-28 PROCEDURE — 99214 OFFICE O/P EST MOD 30 MIN: CPT

## 2021-06-28 PROCEDURE — 77080 DXA BONE DENSITY AXIAL: CPT | Mod: GA

## 2021-08-01 ENCOUNTER — RX RENEWAL (OUTPATIENT)
Age: 81
End: 2021-08-01

## 2021-08-01 RX ORDER — ATORVASTATIN CALCIUM 10 MG/1
10 TABLET, FILM COATED ORAL DAILY
Qty: 90 | Refills: 3 | Status: ACTIVE | COMMUNITY
Start: 2018-02-07 | End: 1900-01-01

## 2021-08-18 RX ORDER — PREDNISONE 5 MG/1
5 TABLET ORAL
Qty: 60 | Refills: 3 | Status: ACTIVE | COMMUNITY
Start: 2021-08-18 | End: 1900-01-01

## 2021-08-23 ENCOUNTER — APPOINTMENT (OUTPATIENT)
Dept: RHEUMATOLOGY | Facility: CLINIC | Age: 81
End: 2021-08-23
Payer: MEDICARE

## 2021-08-23 VITALS
RESPIRATION RATE: 17 BRPM | OXYGEN SATURATION: 96 % | DIASTOLIC BLOOD PRESSURE: 67 MMHG | HEART RATE: 72 BPM | TEMPERATURE: 98 F | SYSTOLIC BLOOD PRESSURE: 151 MMHG | HEIGHT: 65 IN

## 2021-08-23 DIAGNOSIS — M25.532 PAIN IN LEFT WRIST: ICD-10-CM

## 2021-08-23 LAB
25(OH)D3 SERPL-MCNC: 47.6 NG/ML
ALBUMIN SERPL ELPH-MCNC: 4.1 G/DL
ALP BLD-CCNC: 114 U/L
ALT SERPL-CCNC: 26 U/L
ANION GAP SERPL CALC-SCNC: 14 MMOL/L
AST SERPL-CCNC: 27 U/L
BASOPHILS # BLD AUTO: 0.08 K/UL
BASOPHILS NFR BLD AUTO: 0.7 %
BILIRUB SERPL-MCNC: 0.3 MG/DL
BUN SERPL-MCNC: 26 MG/DL
CALCIUM SERPL-MCNC: 9.2 MG/DL
CHLORIDE SERPL-SCNC: 97 MMOL/L
CO2 SERPL-SCNC: 28 MMOL/L
CREAT SERPL-MCNC: 1.09 MG/DL
CRP SERPL-MCNC: 24 MG/L
EOSINOPHIL # BLD AUTO: 0.11 K/UL
EOSINOPHIL NFR BLD AUTO: 0.9 %
HCT VFR BLD CALC: 34.7 %
HGB BLD-MCNC: 11.5 G/DL
IMM GRANULOCYTES NFR BLD AUTO: 1.5 %
LYMPHOCYTES # BLD AUTO: 1.42 K/UL
LYMPHOCYTES NFR BLD AUTO: 11.9 %
MAN DIFF?: NORMAL
MCHC RBC-ENTMCNC: 30.8 PG
MCHC RBC-ENTMCNC: 33.1 GM/DL
MCV RBC AUTO: 93 FL
MONOCYTES # BLD AUTO: 0.6 K/UL
MONOCYTES NFR BLD AUTO: 5 %
NEUTROPHILS # BLD AUTO: 9.51 K/UL
NEUTROPHILS NFR BLD AUTO: 80 %
PLATELET # BLD AUTO: 426 K/UL
POTASSIUM SERPL-SCNC: 3.4 MMOL/L
PROT SERPL-MCNC: 7.1 G/DL
RBC # BLD: 3.73 M/UL
RBC # FLD: 13.5 %
SODIUM SERPL-SCNC: 139 MMOL/L
WBC # FLD AUTO: 11.9 K/UL

## 2021-08-23 PROCEDURE — 99215 OFFICE O/P EST HI 40 MIN: CPT

## 2021-10-24 ENCOUNTER — RX RENEWAL (OUTPATIENT)
Age: 81
End: 2021-10-24

## 2021-11-22 ENCOUNTER — APPOINTMENT (OUTPATIENT)
Dept: RHEUMATOLOGY | Facility: CLINIC | Age: 81
End: 2021-11-22

## 2021-12-06 ENCOUNTER — APPOINTMENT (OUTPATIENT)
Dept: RHEUMATOLOGY | Facility: CLINIC | Age: 81
End: 2021-12-06
Payer: MEDICARE

## 2021-12-06 VITALS
TEMPERATURE: 97.3 F | SYSTOLIC BLOOD PRESSURE: 128 MMHG | OXYGEN SATURATION: 95 % | HEART RATE: 61 BPM | HEIGHT: 66 IN | RESPIRATION RATE: 14 BRPM | DIASTOLIC BLOOD PRESSURE: 74 MMHG | BODY MASS INDEX: 26.52 KG/M2 | WEIGHT: 165 LBS

## 2021-12-06 PROCEDURE — G0008: CPT

## 2021-12-06 PROCEDURE — 90662 IIV NO PRSV INCREASED AG IM: CPT

## 2021-12-06 PROCEDURE — 99214 OFFICE O/P EST MOD 30 MIN: CPT | Mod: 25

## 2021-12-07 LAB
25(OH)D3 SERPL-MCNC: 53.6 NG/ML
ALBUMIN SERPL ELPH-MCNC: 4 G/DL
ALP BLD-CCNC: 108 U/L
ALT SERPL-CCNC: 23 U/L
ANION GAP SERPL CALC-SCNC: 14 MMOL/L
AST SERPL-CCNC: 20 U/L
BASOPHILS # BLD AUTO: 0.07 K/UL
BASOPHILS NFR BLD AUTO: 0.6 %
BILIRUB SERPL-MCNC: 0.6 MG/DL
BUN SERPL-MCNC: 19 MG/DL
CALCIUM SERPL-MCNC: 9.4 MG/DL
CHLORIDE SERPL-SCNC: 97 MMOL/L
CO2 SERPL-SCNC: 29 MMOL/L
COVID-19 SPIKE DOMAIN ANTIBODY INTERPRETATION: POSITIVE
CREAT SERPL-MCNC: 1.07 MG/DL
CRP SERPL-MCNC: 26 MG/L
EOSINOPHIL # BLD AUTO: 0.49 K/UL
EOSINOPHIL NFR BLD AUTO: 4.1 %
HCT VFR BLD CALC: 36 %
HGB BLD-MCNC: 11.6 G/DL
IMM GRANULOCYTES NFR BLD AUTO: 1 %
LYMPHOCYTES # BLD AUTO: 2.24 K/UL
LYMPHOCYTES NFR BLD AUTO: 18.7 %
MAN DIFF?: NORMAL
MCHC RBC-ENTMCNC: 30.8 PG
MCHC RBC-ENTMCNC: 32.2 GM/DL
MCV RBC AUTO: 95.5 FL
MONOCYTES # BLD AUTO: 0.86 K/UL
MONOCYTES NFR BLD AUTO: 7.2 %
NEUTROPHILS # BLD AUTO: 8.19 K/UL
NEUTROPHILS NFR BLD AUTO: 68.4 %
PLATELET # BLD AUTO: 414 K/UL
POTASSIUM SERPL-SCNC: 3.5 MMOL/L
PROT SERPL-MCNC: 6.7 G/DL
RBC # BLD: 3.77 M/UL
RBC # FLD: 14.8 %
SARS-COV-2 AB SERPL IA-ACNC: >250 U/ML
SODIUM SERPL-SCNC: 140 MMOL/L
WBC # FLD AUTO: 11.97 K/UL

## 2022-03-28 ENCOUNTER — APPOINTMENT (OUTPATIENT)
Dept: RHEUMATOLOGY | Facility: CLINIC | Age: 82
End: 2022-03-28
Payer: MEDICARE

## 2022-03-28 VITALS
TEMPERATURE: 97.6 F | OXYGEN SATURATION: 97 % | RESPIRATION RATE: 14 BRPM | DIASTOLIC BLOOD PRESSURE: 83 MMHG | HEART RATE: 65 BPM | HEIGHT: 66 IN | BODY MASS INDEX: 26.52 KG/M2 | SYSTOLIC BLOOD PRESSURE: 122 MMHG | WEIGHT: 165 LBS

## 2022-03-28 PROCEDURE — 90732 PPSV23 VACC 2 YRS+ SUBQ/IM: CPT

## 2022-03-28 PROCEDURE — 99214 OFFICE O/P EST MOD 30 MIN: CPT | Mod: 25

## 2022-03-28 PROCEDURE — G0009: CPT

## 2022-03-29 LAB
ALBUMIN SERPL ELPH-MCNC: 4.3 G/DL
ALP BLD-CCNC: 121 U/L
ALT SERPL-CCNC: 14 U/L
ANION GAP SERPL CALC-SCNC: 18 MMOL/L
AST SERPL-CCNC: 24 U/L
BASOPHILS # BLD AUTO: 0.07 K/UL
BASOPHILS NFR BLD AUTO: 0.6 %
BILIRUB SERPL-MCNC: 0.9 MG/DL
BUN SERPL-MCNC: 21 MG/DL
CALCIUM SERPL-MCNC: 9.5 MG/DL
CHLORIDE SERPL-SCNC: 96 MMOL/L
CO2 SERPL-SCNC: 28 MMOL/L
CREAT SERPL-MCNC: 1.11 MG/DL
CRP SERPL-MCNC: 32 MG/L
EGFR: 50 ML/MIN/1.73M2
EOSINOPHIL # BLD AUTO: 0.59 K/UL
EOSINOPHIL NFR BLD AUTO: 5.1 %
HCT VFR BLD CALC: 38.5 %
HGB BLD-MCNC: 12.2 G/DL
IMM GRANULOCYTES NFR BLD AUTO: 0.3 %
LYMPHOCYTES # BLD AUTO: 2.05 K/UL
LYMPHOCYTES NFR BLD AUTO: 17.8 %
MAN DIFF?: NORMAL
MCHC RBC-ENTMCNC: 30.7 PG
MCHC RBC-ENTMCNC: 31.7 GM/DL
MCV RBC AUTO: 97 FL
MONOCYTES # BLD AUTO: 1.23 K/UL
MONOCYTES NFR BLD AUTO: 10.7 %
NEUTROPHILS # BLD AUTO: 7.55 K/UL
NEUTROPHILS NFR BLD AUTO: 65.5 %
PLATELET # BLD AUTO: 315 K/UL
POTASSIUM SERPL-SCNC: 3.2 MMOL/L
PROT SERPL-MCNC: 7 G/DL
RBC # BLD: 3.97 M/UL
RBC # FLD: 16.6 %
SODIUM SERPL-SCNC: 142 MMOL/L
WBC # FLD AUTO: 11.53 K/UL

## 2022-04-21 ENCOUNTER — RX RENEWAL (OUTPATIENT)
Age: 82
End: 2022-04-21

## 2022-07-25 ENCOUNTER — APPOINTMENT (OUTPATIENT)
Dept: RHEUMATOLOGY | Facility: CLINIC | Age: 82
End: 2022-07-25

## 2022-07-25 VITALS
RESPIRATION RATE: 16 BRPM | TEMPERATURE: 97.4 F | DIASTOLIC BLOOD PRESSURE: 82 MMHG | HEIGHT: 66 IN | OXYGEN SATURATION: 97 % | HEART RATE: 64 BPM | BODY MASS INDEX: 25.39 KG/M2 | WEIGHT: 158 LBS | SYSTOLIC BLOOD PRESSURE: 126 MMHG

## 2022-07-25 DIAGNOSIS — B02.9 ZOSTER W/OUT COMPLICATIONS: ICD-10-CM

## 2022-07-25 LAB
25(OH)D3 SERPL-MCNC: 55.6 NG/ML
ALBUMIN SERPL ELPH-MCNC: 4 G/DL
ALP BLD-CCNC: 110 U/L
ALT SERPL-CCNC: 16 U/L
ANION GAP SERPL CALC-SCNC: 13 MMOL/L
AST SERPL-CCNC: 20 U/L
BASOPHILS # BLD AUTO: 0.04 K/UL
BASOPHILS NFR BLD AUTO: 0.4 %
BILIRUB SERPL-MCNC: 0.8 MG/DL
BUN SERPL-MCNC: 17 MG/DL
CALCIUM SERPL-MCNC: 9.1 MG/DL
CHLORIDE SERPL-SCNC: 101 MMOL/L
CO2 SERPL-SCNC: 26 MMOL/L
CREAT SERPL-MCNC: 1.15 MG/DL
CRP SERPL-MCNC: 27 MG/L
EGFR: 47 ML/MIN/1.73M2
EOSINOPHIL # BLD AUTO: 0.27 K/UL
EOSINOPHIL NFR BLD AUTO: 2.7 %
HCT VFR BLD CALC: 37.4 %
HGB BLD-MCNC: 11.9 G/DL
IMM GRANULOCYTES NFR BLD AUTO: 0.4 %
LYMPHOCYTES # BLD AUTO: 1.66 K/UL
LYMPHOCYTES NFR BLD AUTO: 16.7 %
MAN DIFF?: NORMAL
MCHC RBC-ENTMCNC: 31.6 PG
MCHC RBC-ENTMCNC: 31.8 GM/DL
MCV RBC AUTO: 99.5 FL
MONOCYTES # BLD AUTO: 0.85 K/UL
MONOCYTES NFR BLD AUTO: 8.6 %
NEUTROPHILS # BLD AUTO: 7.06 K/UL
NEUTROPHILS NFR BLD AUTO: 71.2 %
PLATELET # BLD AUTO: 349 K/UL
POTASSIUM SERPL-SCNC: 3.6 MMOL/L
PROT SERPL-MCNC: 6.6 G/DL
RBC # BLD: 3.76 M/UL
RBC # FLD: 16.8 %
SODIUM SERPL-SCNC: 141 MMOL/L
WBC # FLD AUTO: 9.92 K/UL

## 2022-07-25 PROCEDURE — 99214 OFFICE O/P EST MOD 30 MIN: CPT

## 2022-10-25 ENCOUNTER — APPOINTMENT (OUTPATIENT)
Dept: RHEUMATOLOGY | Facility: CLINIC | Age: 82
End: 2022-10-25

## 2022-10-25 VITALS
HEIGHT: 66 IN | RESPIRATION RATE: 16 BRPM | OXYGEN SATURATION: 95 % | SYSTOLIC BLOOD PRESSURE: 119 MMHG | TEMPERATURE: 97.5 F | BODY MASS INDEX: 26.52 KG/M2 | WEIGHT: 165 LBS | DIASTOLIC BLOOD PRESSURE: 73 MMHG | HEART RATE: 61 BPM

## 2022-10-25 LAB
ALBUMIN SERPL ELPH-MCNC: 4 G/DL
ALP BLD-CCNC: 127 U/L
ALT SERPL-CCNC: 16 U/L
ANION GAP SERPL CALC-SCNC: 15 MMOL/L
AST SERPL-CCNC: 19 U/L
BASOPHILS # BLD AUTO: 0.08 K/UL
BASOPHILS NFR BLD AUTO: 1 %
BILIRUB SERPL-MCNC: 0.7 MG/DL
BUN SERPL-MCNC: 17 MG/DL
CALCIUM SERPL-MCNC: 8.8 MG/DL
CHLORIDE SERPL-SCNC: 104 MMOL/L
CO2 SERPL-SCNC: 26 MMOL/L
CREAT SERPL-MCNC: 1.04 MG/DL
CRP SERPL-MCNC: 33 MG/L
EGFR: 53 ML/MIN/1.73M2
EOSINOPHIL # BLD AUTO: 0.2 K/UL
EOSINOPHIL NFR BLD AUTO: 2.5 %
HCT VFR BLD CALC: 34.2 %
HGB BLD-MCNC: 11 G/DL
IMM GRANULOCYTES NFR BLD AUTO: 0.3 %
LYMPHOCYTES # BLD AUTO: 1.59 K/UL
LYMPHOCYTES NFR BLD AUTO: 20.2 %
MAN DIFF?: NORMAL
MCHC RBC-ENTMCNC: 31.3 PG
MCHC RBC-ENTMCNC: 32.2 GM/DL
MCV RBC AUTO: 97.2 FL
MONOCYTES # BLD AUTO: 0.63 K/UL
MONOCYTES NFR BLD AUTO: 8 %
NEUTROPHILS # BLD AUTO: 5.36 K/UL
NEUTROPHILS NFR BLD AUTO: 68 %
PLATELET # BLD AUTO: 276 K/UL
POTASSIUM SERPL-SCNC: 3.8 MMOL/L
PROT SERPL-MCNC: 6.2 G/DL
RBC # BLD: 3.52 M/UL
RBC # FLD: 16.8 %
SODIUM SERPL-SCNC: 145 MMOL/L
WBC # FLD AUTO: 7.88 K/UL

## 2022-10-25 PROCEDURE — 99214 OFFICE O/P EST MOD 30 MIN: CPT | Mod: 25

## 2022-10-25 PROCEDURE — 90662 IIV NO PRSV INCREASED AG IM: CPT

## 2022-10-25 PROCEDURE — G0008: CPT

## 2022-11-04 ENCOUNTER — APPOINTMENT (OUTPATIENT)
Dept: ORTHOPEDIC SURGERY | Facility: CLINIC | Age: 82
End: 2022-11-04

## 2022-11-04 VITALS — WEIGHT: 165 LBS | HEIGHT: 66 IN | BODY MASS INDEX: 26.52 KG/M2

## 2022-11-04 PROCEDURE — 99203 OFFICE O/P NEW LOW 30 MIN: CPT

## 2022-11-04 NOTE — PHYSICAL EXAM
[Outside films reviewed] : Outside films reviewed [Fracture] : Fracture [] : no swelling [FreeTextEntry8] : tender mid axillary line upper ribs, no crepitus, good excursion chest to inspiration [FreeTextEntry5] : fracture multiple ribs right

## 2022-11-04 NOTE — DISCUSSION/SUMMARY
[de-identified] : Ice, Tylenol or Advil or pain, deep breathing hourly, activity modifications limit bending, lifting, twisting

## 2022-11-04 NOTE — HISTORY OF PRESENT ILLNESS
[3] : 3 [0] : 0 [de-identified] : Injured right ribs on arm of a chair, she bent over and ribs hit into the arm. Had xrays at urgent care, referred to office. No shortness of breath [FreeTextEntry1] : rt rib [FreeTextEntry5] : Patient leaned into a table and reports she fractured her rt rib when [facility]. Pain with movement

## 2022-11-06 ENCOUNTER — RX RENEWAL (OUTPATIENT)
Age: 82
End: 2022-11-06

## 2022-12-02 ENCOUNTER — APPOINTMENT (OUTPATIENT)
Dept: ORTHOPEDIC SURGERY | Facility: CLINIC | Age: 82
End: 2022-12-02

## 2022-12-02 VITALS — WEIGHT: 165 LBS | BODY MASS INDEX: 26.52 KG/M2 | HEIGHT: 66 IN

## 2022-12-02 DIAGNOSIS — S22.41XA MULTIPLE FRACTURES OF RIBS, RIGHT SIDE, INITIAL ENCOUNTER FOR CLOSED FRACTURE: ICD-10-CM

## 2022-12-02 PROCEDURE — 99213 OFFICE O/P EST LOW 20 MIN: CPT

## 2022-12-02 PROCEDURE — 71100 X-RAY EXAM RIBS UNI 2 VIEWS: CPT | Mod: RT

## 2022-12-02 NOTE — PHYSICAL EXAM
[Fracture] : Fracture [Right] : right rib [] : no swelling [FreeTextEntry8] : right rib tenderness mild [FreeTextEntry5] : fracture multiple ribs right- early healing

## 2022-12-02 NOTE — HISTORY OF PRESENT ILLNESS
[3] : 3 [0] : 0 [de-identified] : 12/2/22 no change in pain \par \par Injured right ribs on arm of a chair, she bent over and ribs hit into the arm. Had xrays at urgent care, referred to office. No shortness of breath [FreeTextEntry1] : rt rib [FreeTextEntry5] : Patient leaned into a table and reports she fractured her rt rib [facility]. Pain with movement

## 2023-01-09 ENCOUNTER — LABORATORY RESULT (OUTPATIENT)
Age: 83
End: 2023-01-09

## 2023-01-09 ENCOUNTER — APPOINTMENT (OUTPATIENT)
Dept: RHEUMATOLOGY | Facility: CLINIC | Age: 83
End: 2023-01-09
Payer: MEDICARE

## 2023-01-09 VITALS
HEIGHT: 66 IN | TEMPERATURE: 97.2 F | SYSTOLIC BLOOD PRESSURE: 150 MMHG | WEIGHT: 165 LBS | OXYGEN SATURATION: 95 % | HEART RATE: 77 BPM | DIASTOLIC BLOOD PRESSURE: 83 MMHG | BODY MASS INDEX: 26.52 KG/M2 | RESPIRATION RATE: 17 BRPM

## 2023-01-09 PROCEDURE — 99214 OFFICE O/P EST MOD 30 MIN: CPT

## 2023-01-09 NOTE — HISTORY OF PRESENT ILLNESS
[FreeTextEntry1] : 1.  RA: chronic erosive and deforming disease, which has been treated with a variety of medicines.  Most recently Rituxan (last dose 10/22/2012) with a nice response.   Currently with pain in the right wrist as well as the the right ankle (same as previous visits). She is tolerating her medicines.  Her other joints are stable.  She feels that her disease is stable at the moment.  However, the damaged ankle/foot is responsible for increasing pain and the use of a cane.  She doesn't want at this point to think about surgery. In the fall 2016 she developed polyarticular pain and desired additional medicines. This flare responded to low dose prednisone, which she took for many weeks and then tapered off. She navigated a long trip through [place] and did quite well. Since that time she has traveled several times without incident (until COVID). Related to COVID, she stopped methotrexate in early 2020, but she noted no recrudescence of her RA. However, in late Aug 2021 she developed acute pain and swelling of the left wrist, which responded to low dose prednisone.  Whether this was an RA flare or perhaps pseudogout was unclear. She restarted methotrexate and has been quite content. In Oct 2022 she reported a flare in the left hand that spontaneously improved after a few days. \par 2.  Chronic methotrexate use: tolerated without dyspnea, GI symptoms, infection. She stopped methotrexate during COVID. \par 3.  Rituxan exposure: no infections or fevers\par 4.  ILD: no dyspnea\par 5.  Hypothyroidism\par 6.  Osteoporosis: had BMD (spine: osteopenia but values falsely elevated; hip could not be studied secondary to THR; wrist was osteoporotic-but this was a site a major involvement secondary to RA).  She was previously on Fosamax, but this was stopped because of a gastric or esophageal bleed. She received zoledronic acid 2015 through 2018.  Will hold off for now on additional zoledronic acid. A BMD in 2021 demonstrated normal values of the spine but abnormal values in the forearm (? effects of RA). She will need a BMD in 2023 . \par 7.  Vitamin D deficiency\par 8.  Possible bursitis:  History of two falls (hit left hip and face-s/p stitches).  X-rays per patient x 2 negative.  Pain on L lateral thigh.  No bruising.\par 9.  Clavicular fracture: fell during a trip in the summer 2014 and fractured her clavicle.  She received care in [place] and has engaged in physical therapy. \par 10.  Right ankle arthritis: obtained new orthotic for the right foot.  Pain has increased, and she has resorted to the use of a cane. \par 11.  Rotator cuff tear:  left.  Occurred in early 2016.  An X-ray and MRI were performed.  Therapy at first was restricted to physical therapy.  However, she underwent surgery, which she considered to be quite successful. \par 12.  Neck pain:  onset in early June 2017 without radicular or myelopathic symptoms. No trauma. \par 13.  URI's: onset in mid-Dec 2017 and again in April 2018\par 14.  Breast cancer: in 2005\par 15.  Right knee pain:  two episodes of self-limited right knee pain in the summer 2018 without a history of trauma.\par 16.  Shingles: episode in the fall 2019 affecting the left flank.  She still refuses vaccination. Another episode involving the left flank occurred in 2022.  She received an anti-viral. \par 17.  Foot surgery: surgery on the right foot on March 3, 2020 at [facility]. \par 18.  RLE paresthesias: onset in 2021, and she was anxious to try gabapentin.\par 19.  Bilateral LE edema: she was evaluated by cardiology and nephrology in 2022 for the swelling in her legs. Neither subspecialist believed that there were cardiac or renal causes. She was seen by vascular in early Jan 2023. \par \par Meds\par 1.  Methotrexate 15 mg po weekly \par 2.  Atorvastatin 10 mg po daily\par 3.  Synthroid 0.05 mg po daily\par 4.  Folate 1 mg po daily\par 5.  Norvasc 5 mg po daily\par 6.  Hydrochlorothiazide 12.5 mg po daily\par 7.  Ca/D stopped\par 8.  Vitamin D 1xd 1000 IU/d stopped\par 9.  ASA 81 mg/d (patient stopped) stopped\par 10.  Eliquis \par 11.  Zoledronic acid (last September 2018)\par 12.  KCl 10 mEq/d\par 13.  Gabapentin 300 mg 2xd prn\par \par Vaccines\par PNVX  1/12/2010\par PNVX 23  12/5/16  ([id number]; exp 11Jan2018)\par PNVX 23  3/28/22 ([id number]; exp 7/17/23)\par Prevnar 13 valent 2/9/15 ([id number]; exp 5/16)\par Flu vaccine received elsewhere in 2014\par Quadrivalent fluzone 11/23/15  ( AA; exp 6/16)\par Fluzone 9/6/16  ([id number]; exp 17MAR17)\par Fluzone HD  9/14/17  ( AA; exp 6APR2018)\par Fluzone HD 11/12/18  ( AA; exp 29APR2019)\par Fluzone Quadrivalent 12/06/2021 ( AB; exp 6/30/2022)\par Fluzone Quadrivalent 10/25/2022 ( AD; exp 6/30/2023)\par \par

## 2023-01-09 NOTE — PHYSICAL EXAM
[General Appearance - Alert] : alert [General Appearance - In No Acute Distress] : in no acute distress [Sclera] : the sclera and conjunctiva were normal [Extraocular Movements] : extraocular movements were intact [Outer Ear] : the ears and nose were normal in appearance [Examination Of The Oral Cavity] : the lips and gums were normal [Oropharynx] : the oropharynx was normal [Exaggerated Use Of Accessory Muscles For Inspiration] : no accessory muscle use [Heart Rate And Rhythm] : heart rate was normal and rhythm regular [Heart Sounds] : normal S1 and S2 [Bowel Sounds] : normal bowel sounds [Abdomen Soft] : soft [Abdomen Tenderness] : non-tender [Cervical Lymph Nodes Enlarged Posterior Bilaterally] : posterior cervical [Cervical Lymph Nodes Enlarged Anterior Bilaterally] : anterior cervical [Supraclavicular Lymph Nodes Enlarged Bilaterally] : supraclavicular [No CVA Tenderness] : no ~M costovertebral angle tenderness [No Spinal Tenderness] : no spinal tenderness [] : no rash [Sensation] : the sensory exam was normal to light touch and pinprick [FreeTextEntry1] : reduced strength in UE and LE;  hyporeflexia in knees and biceps [Oriented To Time, Place, And Person] : oriented to person, place, and time

## 2023-01-09 NOTE — ASSESSMENT
[FreeTextEntry1] : 1.  RA: chronic erosive and deforming disease, which has been treated with a variety of medicines.  Most recently Rituxan (last dose 10/22/2012) with a nice response.   Currently with pain in the right wrist as well as the the right ankle (same as previous visits). She is tolerating her medicines.  Her other joints are stable.  She feels that her disease is stable at the moment.  However, the damaged ankle/foot is responsible for increasing pain and the use of a cane.  She doesn't want at this point to think about surgery. In the fall 2016 she developed polyarticular pain and desired additional medicines. This flare responded to low dose prednisone, which she took for many weeks and then tapered off. She navigated a long trip through [place] and did quite well. Since that time she has traveled several times without incident (until COVID). Related to COVID, she stopped methotrexate in early 2020, but she noted no recrudescence of her RA. However, in late Aug 2021 she developed acute pain and swelling of the left wrist, which responded to low dose prednisone.  Whether this was an RA flare or perhaps pseudogout was unclear. She restarted methotrexate and has been quite content. In Oct 2022 she reported a flare in the left hand that spontaneously improved after a few days. \par 2.  Chronic methotrexate use: tolerated without dyspnea, GI symptoms, infection. She stopped methotrexate during COVID. \par 3.  Rituxan exposure: no infections or fevers\par 4.  ILD: no dyspnea\par 5.  Hypothyroidism\par 6.  Osteoporosis: had BMD (spine: osteopenia but values falsely elevated; hip could not be studied secondary to THR; wrist was osteoporotic-but this was a site a major involvement secondary to RA).  She was previously on Fosamax, but this was stopped because of a gastric or esophageal bleed. She received zoledronic acid 2015 through 2018.  Will hold off for now on additional zoledronic acid. A BMD in 2021 demonstrated normal values of the spine but abnormal values in the forearm (? effects of RA). She will need a BMD in 2023 . \par 7.  Vitamin D deficiency\par 8.  Possible bursitis:  History of two falls (hit left hip and face-s/p stitches).  X-rays per patient x 2 negative.  Pain on L lateral thigh.  No bruising.\par 9.  Clavicular fracture: fell during a trip in the summer 2014 and fractured her clavicle.  She received care in [place] and has engaged in physical therapy. \par 10.  Right ankle arthritis: obtained new orthotic for the right foot.  Pain has increased, and she has resorted to the use of a cane. \par 11.  Rotator cuff tear:  left.  Occurred in early 2016.  An X-ray and MRI were performed.  Therapy at first was restricted to physical therapy.  However, she underwent surgery, which she considered to be quite successful. \par 12.  Neck pain:  onset in early June 2017 without radicular or myelopathic symptoms. No trauma. \par 13.  URI's: onset in mid-Dec 2017 and again in April 2018\par 14.  Breast cancer: in 2005\par 15.  Right knee pain:  two episodes of self-limited right knee pain in the summer 2018 without a history of trauma.\par 16.  Shingles: episode in the fall 2019 affecting the left flank.  She still refuses vaccination. Another episode involving the left flank occurred in 2022.  She received an anti-viral. \par 17.  Foot surgery: surgery on the right foot on March 3, 2020 at [facility]. \par 18.  RLE paresthesias: onset in 2021, and she was anxious to try gabapentin.\par 19.  Bilateral LE edema: she was evaluated by cardiology and nephrology in 2022 for the swelling in her legs. Neither subspecialist believed that there were cardiac or renal causes. She was seen by vascular in early Jan 2023. \par \par Plan:\par 1.  Lab tests\par 2.  Return 3 months\par 3.  Hold on further zoledronic acid until BMD in 2023\par 4.  Shingles vaccine suggested\par 5.  Calcium\par 6.  Vitamin D\par 7.  Vascular to see\par 8.  High risk medications used in the treatment of rheumatic diseases include steroids, disease modifying agents, immunosuppressive therapies, antimalarials, biologics, and chemotherapy. Regardless of which drug or class of drug, the potential toxicities of these therapies mandate close monitoring in the form of a history, physical, and laboratory tests.\par 9.  Rheumatoid Arthritis, referred to as RA, is a chronic autoimmune disease that can affect any organ in the body posing threats to proper organ function and even to life. Although the primary target are the joints, close surveillance of all bodily functions is required, including but not limited to the peripheral nervous system, ocular and auditory systems, cardiopulmonary function, kidney function, mucocutaneous and musculoskeletal systems as well as constitutional manifestations. Surveillance consists of history, physical, and laboratory tests. Treatment varies, but most of the drugs used are high risk and therefore also require close monitoring in the form of blood and urine tests.\par \par

## 2023-01-09 NOTE — DISCUSSION/SUMMARY
[FreeTextEntry1] : 1.  RA: chronic erosive and deforming disease, which has been treated with a variety of medicines.  Most recently Rituxan (last dose 10/22/2012) with a nice response.   Currently with pain in the right wrist as well as the the right ankle (same as previous visits). She is tolerating her medicines.  Her other joints are stable.  She feels that her disease is stable at the moment.\par 2.  Chronic methotrexate use: tolerating without dyspnea, GI symptoms, infection\par 3.  Rituxan exposure: no infections or fevers\par 4.  ILD: no dyspnea\par 5.  Hypothyroidism\par 6.  Osteoporosis: had BMD (spine: osteopenia but values falsely elevated; hip could not be studied secondary to THR; wrist was osteoporotic-but this was a site a major involvement secondary to RA).  She was previously on Fosamax, but this was stopped because of a gastric or esophageal bleed. \par 7.  Vitamin D deficiency\par 8.  Possible bursitis:  History of two falls (hit left hip and face-s/p stitches).  X-rays per patient x 2 negative.  Pain on L lateral thigh.  No bruising.\par 9.  Clavicular fracture: fell during a trip in the summer 2014 and fractured her clavicle.  She received care in [place] and has engaged in physical therapy. \par 10.  Right ankle arthritis: obtained new orthotic for the right foot.  It is too early to determine its effectiveness.\par 11.  Peripheral vascular disease:  underwent stenting in the right LE.\par \par Plan\par 1.  Continue MTX 15 mg weekly\par 2.  Labs today\par 3.  Consider repeat Rituxan; however, I would prefer to wait until there is a significant flare\par 4. Return 3 months

## 2023-01-10 LAB
25(OH)D3 SERPL-MCNC: 36.2 NG/ML
ALBUMIN SERPL ELPH-MCNC: 4.1 G/DL
ALP BLD-CCNC: 131 U/L
ALT SERPL-CCNC: 19 U/L
ANION GAP SERPL CALC-SCNC: 14 MMOL/L
AST SERPL-CCNC: 26 U/L
BASOPHILS # BLD AUTO: 0.05 K/UL
BASOPHILS NFR BLD AUTO: 1.1 %
BILIRUB SERPL-MCNC: 0.8 MG/DL
BUN SERPL-MCNC: 14 MG/DL
CALCIUM SERPL-MCNC: 8.8 MG/DL
CHLORIDE SERPL-SCNC: 101 MMOL/L
CO2 SERPL-SCNC: 25 MMOL/L
CREAT SERPL-MCNC: 1 MG/DL
CRP SERPL-MCNC: 13 MG/L
EGFR: 56 ML/MIN/1.73M2
EOSINOPHIL # BLD AUTO: 0.12 K/UL
EOSINOPHIL NFR BLD AUTO: 2.7 %
HCT VFR BLD CALC: 35.8 %
HGB BLD-MCNC: 11.6 G/DL
IMM GRANULOCYTES NFR BLD AUTO: 0.4 %
LYMPHOCYTES # BLD AUTO: 1.02 K/UL
LYMPHOCYTES NFR BLD AUTO: 22.9 %
MAN DIFF?: NORMAL
MCHC RBC-ENTMCNC: 31.3 PG
MCHC RBC-ENTMCNC: 32.4 GM/DL
MCV RBC AUTO: 96.5 FL
MONOCYTES # BLD AUTO: 0.85 K/UL
MONOCYTES NFR BLD AUTO: 19.1 %
NEUTROPHILS # BLD AUTO: 2.39 K/UL
NEUTROPHILS NFR BLD AUTO: 53.8 %
PLATELET # BLD AUTO: 198 K/UL
POTASSIUM SERPL-SCNC: 3.5 MMOL/L
PROT SERPL-MCNC: 6.6 G/DL
RBC # BLD: 3.71 M/UL
RBC # FLD: 16.2 %
SODIUM SERPL-SCNC: 140 MMOL/L
TSH SERPL-ACNC: 2.82 UIU/ML
WBC # FLD AUTO: 4.45 K/UL

## 2023-01-13 ENCOUNTER — RX RENEWAL (OUTPATIENT)
Age: 83
End: 2023-01-13

## 2023-01-13 LAB
M TB IFN-G BLD-IMP: NEGATIVE
QUANTIFERON TB PLUS MITOGEN MINUS NIL: >10 IU/ML
QUANTIFERON TB PLUS NIL: 0.09 IU/ML
QUANTIFERON TB PLUS TB1 MINUS NIL: -0.03 IU/ML
QUANTIFERON TB PLUS TB2 MINUS NIL: -0.03 IU/ML

## 2023-05-06 ENCOUNTER — RX RENEWAL (OUTPATIENT)
Age: 83
End: 2023-05-06

## 2023-05-16 ENCOUNTER — RX RENEWAL (OUTPATIENT)
Age: 83
End: 2023-05-16

## 2023-05-16 RX ORDER — METOPROLOL SUCCINATE 25 MG/1
25 TABLET, EXTENDED RELEASE ORAL DAILY
Qty: 90 | Refills: 3 | Status: ACTIVE | COMMUNITY
Start: 2017-10-11 | End: 1900-01-01

## 2023-07-11 ENCOUNTER — APPOINTMENT (OUTPATIENT)
Dept: RHEUMATOLOGY | Facility: CLINIC | Age: 83
End: 2023-07-11
Payer: MEDICARE

## 2023-07-11 VITALS
BODY MASS INDEX: 26.52 KG/M2 | HEART RATE: 59 BPM | WEIGHT: 165 LBS | OXYGEN SATURATION: 95 % | HEIGHT: 66 IN | DIASTOLIC BLOOD PRESSURE: 79 MMHG | SYSTOLIC BLOOD PRESSURE: 153 MMHG

## 2023-07-11 PROCEDURE — 99214 OFFICE O/P EST MOD 30 MIN: CPT

## 2023-07-11 NOTE — ASSESSMENT
[FreeTextEntry1] : 1.  RA: chronic erosive and deforming disease, which has been treated with a variety of medicines.  Most recently Rituxan (last dose 10/22/2012) with a nice response.   Currently with pain in the right wrist as well as the the right ankle (same as previous visits). She is tolerating her medicines.  Her other joints are stable.  She feels that her disease is stable at the moment.  However, the damaged ankle/foot is responsible for increasing pain and the use of a cane.  She doesn't want at this point to think about surgery. In the fall 2016 she developed polyarticular pain and desired additional medicines. This flare responded to low dose prednisone, which she took for many weeks and then tapered off. She navigated a long trip through [place] and did quite well. Since that time she has traveled several times without incident (until COVID). Related to COVID, she stopped methotrexate in early 2020, but she noted no recrudescence of her RA. However, in late Aug 2021 she developed acute pain and swelling of the left wrist, which responded to low dose prednisone.  Whether this was an RA flare or perhaps pseudogout was unclear. She restarted methotrexate and has been quite content. In Oct 2022 she reported a flare in the left hand that spontaneously improved after a few days. \par 2.  Chronic methotrexate use: tolerated without dyspnea, GI symptoms, infection. She stopped methotrexate during COVID. \par 3.  Rituxan exposure: no infections or fevers\par 4.  ILD: no dyspnea\par 5.  Hypothyroidism\par 6.  Osteoporosis: had BMD (spine: osteopenia but values falsely elevated; hip could not be studied secondary to THR; wrist was osteoporotic-but this was a site a major involvement secondary to RA).  She was previously on Fosamax, but this was stopped because of a gastric or esophageal bleed. She received zoledronic acid 2015 through 2018.  Will hold off for now on additional zoledronic acid. A BMD in 2021 demonstrated normal values of the spine but abnormal values in the forearm (? effects of RA). She will need a BMD in 2023 . \par 7.  Vitamin D deficiency\par 8.  Possible bursitis:  History of two falls (hit left hip and face-s/p stitches).  X-rays per patient x 2 negative.  Pain on L lateral thigh.  No bruising.\par 9.  Clavicular fracture: fell during a trip in the summer 2014 and fractured her clavicle.  She received care in [place] and has engaged in physical therapy. \par 10.  Right ankle arthritis: obtained new orthotic for the right foot.  Pain has increased, and she has resorted to the use of a cane. \par 11.  Rotator cuff tear:  left.  Occurred in early 2016.  An X-ray and MRI were performed.  Therapy at first was restricted to physical therapy.  However, she underwent surgery, which she considered to be quite successful. \par 12.  Neck pain:  onset in early June 2017 without radicular or myelopathic symptoms. No trauma. \par 13.  URI's: onset in mid-Dec 2017 and again in April 2018\par 14.  Breast cancer: in 2005\par 15.  Right knee pain:  two episodes of self-limited right knee pain in the summer 2018 without a history of trauma.\par 16.  Shingles: episode in the fall 2019 affecting the left flank.  She still refuses vaccination. Another episode involving the left flank occurred in 2022.  She received an anti-viral. \par 17.  Foot surgery: surgery on the right foot on March 3, 2020 at [facility]. \par 18.  RLE paresthesias: onset in 2021, and she was anxious to try gabapentin.\par 19.  Bilateral LE edema: she was evaluated by cardiology and nephrology in 2022 for the swelling in her legs. Neither subspecialist believed that there were cardiac or renal causes. She was seen by vascular in early Jan 2023. She has an appointment with cardiology in Jul 2023. \par \par Plan:\par 1.  Lab tests\par 2.  Return 3 months\par 3.  Hold on further zoledronic acid until BMD in 2023\par 4.  Shingles vaccine suggested\par 5.  Calcium\par 6.  Vitamin D\par 7.  Cardiology to see\par 8.  High risk medications used in the treatment of rheumatic diseases include steroids, disease modifying agents, immunosuppressive therapies, antimalarials, biologics, and chemotherapy. Regardless of which drug or class of drug, the potential toxicities of these therapies mandate close monitoring in the form of a history, physical, and laboratory tests.\par 9.  Rheumatoid Arthritis, referred to as RA, is a chronic autoimmune disease that can affect any organ in the body posing threats to proper organ function and even to life. Although the primary target are the joints, close surveillance of all bodily functions is required, including but not limited to the peripheral nervous system, ocular and auditory systems, cardiopulmonary function, kidney function, mucocutaneous and musculoskeletal systems as well as constitutional manifestations. Surveillance consists of history, physical, and laboratory tests. Treatment varies, but most of the drugs used are high risk and therefore also require close monitoring in the form of blood and urine tests.\par \par

## 2023-07-11 NOTE — DISCUSSION/SUMMARY
[FreeTextEntry1] : 1.  RA: chronic erosive and deforming disease, which has been treated with a variety of medicines.  Most recently Rituxan (last dose 10/22/2012) with a nice response.   Currently with pain in the right wrist as well as the the right ankle (same as previous visits). She is tolerating her medicines.  Her other joints are stable.  She feels that her disease is stable at the moment.\par 2.  Chronic methotrexate use: tolerating without dyspnea, GI symptoms, infection\par 3.  Rituxan exposure: no infections or fevers\par 4.  ILD: no dyspnea\par 5.  Hypothyroidism\par 6.  Osteoporosis: had BMD (spine: osteopenia but values falsely elevated; hip could not be studied secondary to THR; wrist was osteoporotic-but this was a site a major involvement secondary to RA).  She was previously on Fosamax, but this was stopped because of a gastric or esophageal bleed. \par 7.  Vitamin D deficiency\par 8.  Possible bursitis:  History of two falls (hit left hip and face-s/p stitches).  X-rays per patient x 2 negative.  Pain on L lateral thigh.  No bruising.\par 9.  Clavicular fracture: fell during a trip in the summer 2014 and fractured her clavicle.  She received care in [place] and has engaged in physical therapy. \par 10.  Right ankle arthritis: obtained new orthotic for the right foot.  It is too early to determine its effectiveness.\par 11.  Peripheral vascular disease:  underwent stenting in the right LE.\par \par Plan\par 1.  Continue MTX 15 mg weekly\par 2.  Labs today\par 3.  Consider repeat Rituxan; however, I would prefer to wait until there is a significant flare\par 4. Return 3 months

## 2023-07-11 NOTE — PHYSICAL EXAM
[General Appearance - Alert] : alert [General Appearance - In No Acute Distress] : in no acute distress [Sclera] : the sclera and conjunctiva were normal [Extraocular Movements] : extraocular movements were intact [Outer Ear] : the ears and nose were normal in appearance [Examination Of The Oral Cavity] : the lips and gums were normal [Oropharynx] : the oropharynx was normal [Exaggerated Use Of Accessory Muscles For Inspiration] : no accessory muscle use [Heart Rate And Rhythm] : heart rate was normal and rhythm regular [Heart Sounds] : normal S1 and S2 [Abdomen Soft] : soft [Bowel Sounds] : normal bowel sounds [Abdomen Tenderness] : non-tender [Cervical Lymph Nodes Enlarged Posterior Bilaterally] : posterior cervical [Cervical Lymph Nodes Enlarged Anterior Bilaterally] : anterior cervical [Supraclavicular Lymph Nodes Enlarged Bilaterally] : supraclavicular [No CVA Tenderness] : no ~M costovertebral angle tenderness [No Spinal Tenderness] : no spinal tenderness [] : no rash [Sensation] : the sensory exam was normal to light touch and pinprick [FreeTextEntry1] : reduced strength in UE and LE;  hyporeflexia in knees and biceps [Oriented To Time, Place, And Person] : oriented to person, place, and time

## 2023-07-11 NOTE — HISTORY OF PRESENT ILLNESS
[FreeTextEntry1] : 1.  RA: chronic erosive and deforming disease, which has been treated with a variety of medicines.  Most recently Rituxan (last dose 10/22/2012) with a nice response.   Currently with pain in the right wrist as well as the the right ankle (same as previous visits). She is tolerating her medicines.  Her other joints are stable.  She feels that her disease is stable at the moment.  However, the damaged ankle/foot is responsible for increasing pain and the use of a cane.  She doesn't want at this point to think about surgery. In the fall 2016 she developed polyarticular pain and desired additional medicines. This flare responded to low dose prednisone, which she took for many weeks and then tapered off. She navigated a long trip through [place] and did quite well. Since that time she has traveled several times without incident (until COVID). Related to COVID, she stopped methotrexate in early 2020, but she noted no recrudescence of her RA. However, in late Aug 2021 she developed acute pain and swelling of the left wrist, which responded to low dose prednisone.  Whether this was an RA flare or perhaps pseudogout was unclear. She restarted methotrexate and has been quite content. In Oct 2022 she reported a flare in the left hand that spontaneously improved after a few days. \par 2.  Chronic methotrexate use: tolerated without dyspnea, GI symptoms, infection. She stopped methotrexate during COVID. \par 3.  Rituxan exposure: no infections or fevers\par 4.  ILD: no dyspnea\par 5.  Hypothyroidism\par 6.  Osteoporosis: had BMD (spine: osteopenia but values falsely elevated; hip could not be studied secondary to THR; wrist was osteoporotic-but this was a site a major involvement secondary to RA).  She was previously on Fosamax, but this was stopped because of a gastric or esophageal bleed. She received zoledronic acid 2015 through 2018.  Will hold off for now on additional zoledronic acid. A BMD in 2021 demonstrated normal values of the spine but abnormal values in the forearm (? effects of RA). She will need a BMD in 2023 . \par 7.  Vitamin D deficiency\par 8.  Possible bursitis:  History of two falls (hit left hip and face-s/p stitches).  X-rays per patient x 2 negative.  Pain on L lateral thigh.  No bruising.\par 9.  Clavicular fracture: fell during a trip in the summer 2014 and fractured her clavicle.  She received care in [place] and has engaged in physical therapy. \par 10.  Right ankle arthritis: obtained new orthotic for the right foot.  Pain has increased, and she has resorted to the use of a cane. \par 11.  Rotator cuff tear:  left.  Occurred in early 2016.  An X-ray and MRI were performed.  Therapy at first was restricted to physical therapy.  However, she underwent surgery, which she considered to be quite successful. \par 12.  Neck pain:  onset in early June 2017 without radicular or myelopathic symptoms. No trauma. \par 13.  URI's: onset in mid-Dec 2017 and again in April 2018\par 14.  Breast cancer: in 2005\par 15.  Right knee pain:  two episodes of self-limited right knee pain in the summer 2018 without a history of trauma.\par 16.  Shingles: episode in the fall 2019 affecting the left flank.  She still refuses vaccination. Another episode involving the left flank occurred in 2022.  She received an anti-viral. \par 17.  Foot surgery: surgery on the right foot on March 3, 2020 at [facility]. \par 18.  RLE paresthesias: onset in 2021, and she was anxious to try gabapentin.\par 19.  Bilateral LE edema: she was evaluated by cardiology and nephrology in 2022 for the swelling in her legs. Neither subspecialist believed that there were cardiac or renal causes. She was seen by vascular in early Jan 2023. She has an appointment with cardiology in Jul 2023. \par \par Meds\par 1.  Methotrexate 15 mg po weekly \par 2.  Atorvastatin 10 mg po daily\par 3.  Synthroid 0.05 mg po daily\par 4.  Folate 1 mg po daily\par 5.  Norvasc 5 mg po daily\par 6.  Hydrochlorothiazide 12.5 mg po daily\par 7.  Ca/D stopped\par 8.  Vitamin D 1xd 1000 IU/d stopped\par 9.  ASA 81 mg/d (patient stopped) stopped\par 10.  Eliquis \par 11.  Zoledronic acid (last September 2018)\par 12.  KCl 10 mEq/d\par 13.  Gabapentin 300 mg 2xd prn\par \par Vaccines\par PNVX  1/12/2010\par PNVX 23  12/5/16  ([id number]; exp 11Jan2018)\par PNVX 23  3/28/22 ([id number]; exp 7/17/23)\par Prevnar 13 valent 2/9/15 ([id number]; exp 5/16)\par Flu vaccine received elsewhere in 2014\par Quadrivalent fluzone 11/23/15  ( AA; exp 6/16)\par Fluzone 9/6/16  ([id number]; exp 17MAR17)\par Fluzone HD  9/14/17  ( AA; exp 6APR2018)\par Fluzone HD 11/12/18  ( AA; exp 29APR2019)\par Fluzone Quadrivalent 12/06/2021 ( AB; exp 6/30/2022)\par Fluzone Quadrivalent 10/25/2022 ( AD; exp 6/30/2023)\par \par

## 2023-07-12 LAB
ALBUMIN SERPL ELPH-MCNC: 4.2 G/DL
ALP BLD-CCNC: 136 U/L
ALT SERPL-CCNC: 14 U/L
ANION GAP SERPL CALC-SCNC: 11 MMOL/L
AST SERPL-CCNC: 24 U/L
BILIRUB SERPL-MCNC: 1 MG/DL
BUN SERPL-MCNC: 20 MG/DL
CALCIUM SERPL-MCNC: 9 MG/DL
CHLORIDE SERPL-SCNC: 103 MMOL/L
CO2 SERPL-SCNC: 28 MMOL/L
CREAT SERPL-MCNC: 1.34 MG/DL
CRP SERPL-MCNC: 20 MG/L
EGFR: 39 ML/MIN/1.73M2
POTASSIUM SERPL-SCNC: 3.8 MMOL/L
PROT SERPL-MCNC: 6.5 G/DL
SODIUM SERPL-SCNC: 142 MMOL/L

## 2023-09-28 NOTE — PATIENT PROFILE ADULT - BRADEN NUTRITION
(3) adequate Low Dose Naltrexone Counseling- I discussed with the patient the potential risks and side effects of low dose naltrexone including but not limited to: more vivid dreams, headaches, nausea, vomiting, abdominal pain, fatigue, dizziness, and anxiety.

## 2023-10-23 ENCOUNTER — RX RENEWAL (OUTPATIENT)
Age: 83
End: 2023-10-23

## 2023-10-31 ENCOUNTER — APPOINTMENT (OUTPATIENT)
Dept: RHEUMATOLOGY | Facility: CLINIC | Age: 83
End: 2023-10-31
Payer: MEDICARE

## 2023-10-31 VITALS
WEIGHT: 165 LBS | BODY MASS INDEX: 26.52 KG/M2 | OXYGEN SATURATION: 97 % | HEIGHT: 66 IN | HEART RATE: 82 BPM | DIASTOLIC BLOOD PRESSURE: 83 MMHG | SYSTOLIC BLOOD PRESSURE: 192 MMHG

## 2023-10-31 DIAGNOSIS — R60.0 LOCALIZED EDEMA: ICD-10-CM

## 2023-10-31 DIAGNOSIS — E03.9 HYPOTHYROIDISM, UNSPECIFIED: ICD-10-CM

## 2023-10-31 PROCEDURE — 90662 IIV NO PRSV INCREASED AG IM: CPT

## 2023-10-31 PROCEDURE — G0008: CPT

## 2023-10-31 PROCEDURE — 99214 OFFICE O/P EST MOD 30 MIN: CPT | Mod: 25

## 2023-11-01 LAB
25(OH)D3 SERPL-MCNC: 26.1 NG/ML
ALBUMIN SERPL ELPH-MCNC: 4.1 G/DL
ALP BLD-CCNC: 130 U/L
ALT SERPL-CCNC: 14 U/L
ANION GAP SERPL CALC-SCNC: 10 MMOL/L
AST SERPL-CCNC: 21 U/L
BASOPHILS # BLD AUTO: 0.06 K/UL
BASOPHILS NFR BLD AUTO: 0.8 %
BILIRUB SERPL-MCNC: 1.2 MG/DL
BUN SERPL-MCNC: 14 MG/DL
CALCIUM SERPL-MCNC: 9.1 MG/DL
CHLORIDE SERPL-SCNC: 101 MMOL/L
CO2 SERPL-SCNC: 30 MMOL/L
CREAT SERPL-MCNC: 0.96 MG/DL
CRP SERPL-MCNC: 21 MG/L
EGFR: 58 ML/MIN/1.73M2
EOSINOPHIL # BLD AUTO: 0.27 K/UL
EOSINOPHIL NFR BLD AUTO: 3.6 %
HCT VFR BLD CALC: 37.3 %
HGB BLD-MCNC: 11.8 G/DL
IMM GRANULOCYTES NFR BLD AUTO: 0.5 %
LYMPHOCYTES # BLD AUTO: 1.3 K/UL
LYMPHOCYTES NFR BLD AUTO: 17.2 %
MAN DIFF?: NORMAL
MCHC RBC-ENTMCNC: 31.6 GM/DL
MCHC RBC-ENTMCNC: 31.8 PG
MCV RBC AUTO: 100.5 FL
MONOCYTES # BLD AUTO: 0.61 K/UL
MONOCYTES NFR BLD AUTO: 8.1 %
NEUTROPHILS # BLD AUTO: 5.28 K/UL
NEUTROPHILS NFR BLD AUTO: 69.8 %
PLATELET # BLD AUTO: 249 K/UL
POTASSIUM SERPL-SCNC: 3.6 MMOL/L
PROT SERPL-MCNC: 6.4 G/DL
RBC # BLD: 3.71 M/UL
RBC # FLD: 17.1 %
SODIUM SERPL-SCNC: 141 MMOL/L
WBC # FLD AUTO: 7.56 K/UL

## 2023-11-10 ENCOUNTER — APPOINTMENT (OUTPATIENT)
Dept: ENDOCRINOLOGY | Facility: CLINIC | Age: 83
End: 2023-11-10

## 2023-11-17 ENCOUNTER — APPOINTMENT (OUTPATIENT)
Dept: ENDOCRINOLOGY | Facility: CLINIC | Age: 83
End: 2023-11-17
Payer: MEDICARE

## 2023-11-17 VITALS — WEIGHT: 160 LBS | HEIGHT: 64.5 IN | BODY MASS INDEX: 26.98 KG/M2

## 2023-11-17 PROCEDURE — 77080 DXA BONE DENSITY AXIAL: CPT | Mod: GA

## 2024-01-12 ENCOUNTER — RX RENEWAL (OUTPATIENT)
Age: 84
End: 2024-01-12

## 2024-01-30 ENCOUNTER — APPOINTMENT (OUTPATIENT)
Dept: RHEUMATOLOGY | Facility: CLINIC | Age: 84
End: 2024-01-30
Payer: MEDICARE

## 2024-01-30 VITALS
HEART RATE: 69 BPM | DIASTOLIC BLOOD PRESSURE: 85 MMHG | BODY MASS INDEX: 26.98 KG/M2 | OXYGEN SATURATION: 95 % | HEIGHT: 64.5 IN | SYSTOLIC BLOOD PRESSURE: 158 MMHG | WEIGHT: 160 LBS

## 2024-01-30 DIAGNOSIS — M81.0 AGE-RELATED OSTEOPOROSIS W/OUT CURRENT PATHOLOGICAL FRACTURE: ICD-10-CM

## 2024-01-30 PROCEDURE — 99214 OFFICE O/P EST MOD 30 MIN: CPT

## 2024-01-30 PROCEDURE — G2211 COMPLEX E/M VISIT ADD ON: CPT

## 2024-01-31 LAB
ALBUMIN SERPL ELPH-MCNC: 4.1 G/DL
ALP BLD-CCNC: 121 U/L
ALT SERPL-CCNC: 15 U/L
ANION GAP SERPL CALC-SCNC: 12 MMOL/L
AST SERPL-CCNC: 24 U/L
BASOPHILS # BLD AUTO: 0.05 K/UL
BASOPHILS NFR BLD AUTO: 0.5 %
BILIRUB SERPL-MCNC: 1.1 MG/DL
BUN SERPL-MCNC: 17 MG/DL
CALCIUM SERPL-MCNC: 8.5 MG/DL
CHLORIDE SERPL-SCNC: 101 MMOL/L
CO2 SERPL-SCNC: 27 MMOL/L
CREAT SERPL-MCNC: 0.94 MG/DL
CRP SERPL-MCNC: 25 MG/L
EGFR: 60 ML/MIN/1.73M2
EOSINOPHIL # BLD AUTO: 0.21 K/UL
EOSINOPHIL NFR BLD AUTO: 2.2 %
HCT VFR BLD CALC: 35.6 %
HGB BLD-MCNC: 11.1 G/DL
IMM GRANULOCYTES NFR BLD AUTO: 0.3 %
LYMPHOCYTES # BLD AUTO: 1.1 K/UL
LYMPHOCYTES NFR BLD AUTO: 11.6 %
MAN DIFF?: NORMAL
MCHC RBC-ENTMCNC: 31 PG
MCHC RBC-ENTMCNC: 31.2 GM/DL
MCV RBC AUTO: 99.4 FL
MONOCYTES # BLD AUTO: 0.55 K/UL
MONOCYTES NFR BLD AUTO: 5.8 %
NEUTROPHILS # BLD AUTO: 7.55 K/UL
NEUTROPHILS NFR BLD AUTO: 79.6 %
PLATELET # BLD AUTO: 273 K/UL
POTASSIUM SERPL-SCNC: 3.3 MMOL/L
PROT SERPL-MCNC: 6.2 G/DL
RBC # BLD: 3.58 M/UL
RBC # FLD: 17.2 %
SODIUM SERPL-SCNC: 139 MMOL/L
WBC # FLD AUTO: 9.49 K/UL

## 2024-01-31 NOTE — ASSESSMENT
[FreeTextEntry1] : 1.  RA: chronic erosive and deforming disease, which has been treated with a variety of medicines.  Most recently Rituxan (last dose 10/22/2012) with a nice response.   Currently with pain in the right wrist as well as the the right ankle (same as previous visits). She is tolerating her medicines.  Her other joints are stable.  She feels that her disease is stable at the moment.  However, the damaged ankle/foot is responsible for increasing pain and the use of a cane.  She doesn't want at this point to think about surgery. In the fall 2016 she developed polyarticular pain and desired additional medicines. This flare responded to low dose prednisone, which she took for many weeks and then tapered off. She navigated a long trip through [place] and did quite well. Since that time she has traveled several times without incident (until COVID). Related to COVID, she stopped methotrexate in early 2020, but she noted no recrudescence of her RA. However, in late Aug 2021 she developed acute pain and swelling of the left wrist, which responded to low dose prednisone.  Whether this was an RA flare or perhaps pseudogout was unclear. She restarted methotrexate and has been quite content. In Oct 2022 she reported a flare in the left hand that spontaneously improved after a few days. After that time, her RA had been stable.  2.  Chronic methotrexate use: tolerated without dyspnea, GI symptoms, infection. She stopped methotrexate during COVID.  3.  Rituxan exposure: no infections or fevers 4.  ILD: no dyspnea 5.  Hypothyroidism 6.  Osteoporosis: had BMD (spine: osteopenia but values falsely elevated; hip could not be studied secondary to THR; wrist was osteoporotic-but this was a site a major involvement secondary to RA).  She was previously on Fosamax, but this was stopped because of a gastric or esophageal bleed. She received zoledronic acid 2015 through 2018.  Will hold off for now on additional zoledronic acid. A BMD in 2021 demonstrated normal values of the spine but abnormal values in the forearm (? effects of RA). A BMD in 2023 demonstrated osteoporosis of the wrist, but spine and hip were not performed. I am not sure that the wrist can be interpreted.  7.  Vitamin D deficiency 8.  Possible bursitis:  History of two falls (hit left hip and face-s/p stitches).  X-rays per patient x 2 negative.  Pain on L lateral thigh.  No bruising. 9.  Clavicular fracture: fell during a trip in the summer 2014 and fractured her clavicle.  She received care in [place] and has engaged in physical therapy.  10.  Right ankle arthritis: obtained new orthotic for the right foot.  Pain has increased, and she has resorted to the use of a cane.  11.  Rotator cuff tear:  left.  Occurred in early 2016.  An X-ray and MRI were performed.  Therapy at first was restricted to physical therapy.  However, she underwent surgery, which she considered to be quite successful.  12.  Neck pain:  onset in early June 2017 without radicular or myelopathic symptoms. No trauma.  13.  URI's: onset in mid-Dec 2017 and again in April 2018 14.  Breast cancer: in 2005 15.  Right knee pain:  two episodes of self-limited right knee pain in the summer 2018 without a history of trauma. 16.  Shingles: episode in the fall 2019 affecting the left flank.  She still refuses vaccination. Another episode involving the left flank occurred in 2022.  She received an anti-viral.  17.  Foot surgery: surgery on the right foot on March 3, 2020 at [facility].  18.  RLE paresthesias: onset in 2021, and she was anxious to try gabapentin. 19.  Bilateral LE edema: she was evaluated by cardiology and nephrology in 2022 for the swelling in her legs. Neither subspecialist believed that there were cardiac or renal causes. She was seen by vascular in early Jan 2023. She has an appointment with cardiology in Jul 2023.  20.  Hypertension: medicines changed in 2023.   Plan Lab tests Reviewed internal and/or external records  Contact me Shingrix vaccine recommended Rheumatoid Arthritis, referred to as RA, is a chronic autoimmune disease that can affect any organ in the body posing threats to proper organ function and even to life. Although the primary target are the joints, close surveillance of all bodily functions is required, including but not limited to the peripheral nervous system, ocular and auditory systems, cardiopulmonary function, kidney function, mucocutaneous and musculoskeletal systems as well as constitutional manifestations. Surveillance consists of history, physical, and laboratory tests. Treatment varies, but most of the drugs used are high risk and therefore also require close monitoring in the form of blood and urine tests. High risk medications used in the treatment of rheumatic diseases include steroids, disease modifying agents, immunosuppressive therapies, antimalarials, biologics, and chemotherapy. Regardless of which drug or class of drug, the potential toxicities of these therapies mandate close monitoring in the form of a history, physical, and laboratory tests. Return 3 months

## 2024-01-31 NOTE — HISTORY OF PRESENT ILLNESS
[FreeTextEntry1] : 1.  RA: chronic erosive and deforming disease, which has been treated with a variety of medicines.  Most recently Rituxan (last dose 10/22/2012) with a nice response.   Currently with pain in the right wrist as well as the the right ankle (same as previous visits). She is tolerating her medicines.  Her other joints are stable.  She feels that her disease is stable at the moment.  However, the damaged ankle/foot is responsible for increasing pain and the use of a cane.  She doesn't want at this point to think about surgery. In the fall 2016 she developed polyarticular pain and desired additional medicines. This flare responded to low dose prednisone, which she took for many weeks and then tapered off. She navigated a long trip through [place] and did quite well. Since that time she has traveled several times without incident (until COVID). Related to COVID, she stopped methotrexate in early 2020, but she noted no recrudescence of her RA. However, in late Aug 2021 she developed acute pain and swelling of the left wrist, which responded to low dose prednisone.  Whether this was an RA flare or perhaps pseudogout was unclear. She restarted methotrexate and has been quite content. In Oct 2022 she reported a flare in the left hand that spontaneously improved after a few days. After that time, her RA had been stable.  2.  Chronic methotrexate use: tolerated without dyspnea, GI symptoms, infection. She stopped methotrexate during COVID.  3.  Rituxan exposure: no infections or fevers 4.  ILD: no dyspnea 5.  Hypothyroidism 6.  Osteoporosis: had BMD (spine: osteopenia but values falsely elevated; hip could not be studied secondary to THR; wrist was osteoporotic-but this was a site a major involvement secondary to RA).  She was previously on Fosamax, but this was stopped because of a gastric or esophageal bleed. She received zoledronic acid 2015 through 2018.  Will hold off for now on additional zoledronic acid. A BMD in 2021 demonstrated normal values of the spine but abnormal values in the forearm (? effects of RA). A BMD in 2023 demonstrated osteoporosis of the wrist, but spine and hip were not performed. I am not sure that the wrist can be interpreted.  7.  Vitamin D deficiency 8.  Possible bursitis:  History of two falls (hit left hip and face-s/p stitches).  X-rays per patient x 2 negative.  Pain on L lateral thigh.  No bruising. 9.  Clavicular fracture: fell during a trip in the summer 2014 and fractured her clavicle.  She received care in [place] and has engaged in physical therapy.  10.  Right ankle arthritis: obtained new orthotic for the right foot.  Pain has increased, and she has resorted to the use of a cane.  11.  Rotator cuff tear:  left.  Occurred in early 2016.  An X-ray and MRI were performed.  Therapy at first was restricted to physical therapy.  However, she underwent surgery, which she considered to be quite successful.  12.  Neck pain:  onset in early June 2017 without radicular or myelopathic symptoms. No trauma.  13.  URI's: onset in mid-Dec 2017 and again in April 2018 14.  Breast cancer: in 2005 15.  Right knee pain:  two episodes of self-limited right knee pain in the summer 2018 without a history of trauma. 16.  Shingles: episode in the fall 2019 affecting the left flank.  She still refuses vaccination. Another episode involving the left flank occurred in 2022.  She received an anti-viral.  17.  Foot surgery: surgery on the right foot on March 3, 2020 at [facility].  18.  RLE paresthesias: onset in 2021, and she was anxious to try gabapentin. 19.  Bilateral LE edema: she was evaluated by cardiology and nephrology in 2022 for the swelling in her legs. Neither subspecialist believed that there were cardiac or renal causes. She was seen by vascular in early Jan 2023. She has an appointment with cardiology in Jul 2023.  20.  Hypertension: medicines changed in 2023.   Meds 1.  Methotrexate 15 mg po weekly  2.  Atorvastatin 10 mg po daily 3.  Synthroid 0.05 mg po daily 4.  Folate 1 mg po daily 5.  Losartan 50 mg/d 6.  Hydrochlorothiazide 12.5 mg po daily 7.  Ca/D stopped 8.  Vitamin D 1xd 1000 IU/d stopped 9.  ASA 81 mg/d (patient stopped) stopped 10.  Eliquis 5 mg 2xd 11.  Zoledronic acid (last September 2018) 12.  Metoprolol 25 mg/d 13. Amlodipine 5 mg/d  Vaccines PNVX  1/12/2010 PNVX 23  12/5/16  ([id number]; exp 11Jan2018) PNVX 23  3/28/22 ([id number]; exp 7/17/23) Prevnar 13 valent 2/9/15 ([id number]; exp 5/16) Flu vaccine received elsewhere in 2014 Quadrivalent fluzone 11/23/15  ( AA; exp 6/16) Fluzone 9/6/16  ([id number]; exp 17MAR17) Fluzone HD  9/14/17  ( AA; exp 6APR2018) Fluzone HD 11/12/18  ( AA; exp 29APR2019) Fluzone Quadrivalent 12/06/2021 ( AB; exp 6/30/2022) Fluzone Quadrivalent 10/25/2022 ( AD; exp 6/30/2023) Fluzone Quadrivalent 10/31/23 ([id number]; exp 6/2024)

## 2024-01-31 NOTE — DISCUSSION/SUMMARY
[FreeTextEntry1] : 1.  RA: chronic erosive and deforming disease, which has been treated with a variety of medicines.  Most recently Rituxan (last dose 10/22/2012) with a nice response.   Currently with pain in the right wrist as well as the the right ankle (same as previous visits). She is tolerating her medicines.  Her other joints are stable.  She feels that her disease is stable at the moment.\par  2.  Chronic methotrexate use: tolerating without dyspnea, GI symptoms, infection\par  3.  Rituxan exposure: no infections or fevers\par  4.  ILD: no dyspnea\par  5.  Hypothyroidism\par  6.  Osteoporosis: had BMD (spine: osteopenia but values falsely elevated; hip could not be studied secondary to THR; wrist was osteoporotic-but this was a site a major involvement secondary to RA).  She was previously on Fosamax, but this was stopped because of a gastric or esophageal bleed. \par  7.  Vitamin D deficiency\par  8.  Possible bursitis:  History of two falls (hit left hip and face-s/p stitches).  X-rays per patient x 2 negative.  Pain on L lateral thigh.  No bruising.\par  9.  Clavicular fracture: fell during a trip in the summer 2014 and fractured her clavicle.  She received care in [place] and has engaged in physical therapy. \par  10.  Right ankle arthritis: obtained new orthotic for the right foot.  It is too early to determine its effectiveness.\par  11.  Peripheral vascular disease:  underwent stenting in the right LE.\par  \par  Plan\par  1.  Continue MTX 15 mg weekly\par  2.  Labs today\par  3.  Consider repeat Rituxan; however, I would prefer to wait until there is a significant flare\par  4. Return 3 months

## 2024-02-03 LAB
M TB IFN-G BLD-IMP: NEGATIVE
QUANTIFERON TB PLUS MITOGEN MINUS NIL: 2.72 IU/ML
QUANTIFERON TB PLUS NIL: 0.02 IU/ML
QUANTIFERON TB PLUS TB1 MINUS NIL: 0 IU/ML
QUANTIFERON TB PLUS TB2 MINUS NIL: -0.01 IU/ML

## 2024-04-01 RX ORDER — GABAPENTIN 300 MG/1
300 CAPSULE ORAL
Qty: 180 | Refills: 3 | Status: ACTIVE | COMMUNITY
Start: 2021-08-23 | End: 1900-01-01

## 2024-04-30 ENCOUNTER — APPOINTMENT (OUTPATIENT)
Dept: RHEUMATOLOGY | Facility: CLINIC | Age: 84
End: 2024-04-30
Payer: MEDICARE

## 2024-04-30 VITALS
DIASTOLIC BLOOD PRESSURE: 84 MMHG | HEIGHT: 64.5 IN | SYSTOLIC BLOOD PRESSURE: 153 MMHG | OXYGEN SATURATION: 94 % | BODY MASS INDEX: 26.98 KG/M2 | RESPIRATION RATE: 16 BRPM | TEMPERATURE: 98.1 F | WEIGHT: 160 LBS | HEART RATE: 71 BPM

## 2024-04-30 DIAGNOSIS — Z79.899 OTHER LONG TERM (CURRENT) DRUG THERAPY: ICD-10-CM

## 2024-04-30 DIAGNOSIS — M06.9 RHEUMATOID ARTHRITIS, UNSPECIFIED: ICD-10-CM

## 2024-04-30 DIAGNOSIS — J06.9 ACUTE UPPER RESPIRATORY INFECTION, UNSPECIFIED: ICD-10-CM

## 2024-04-30 DIAGNOSIS — Z79.631 LONG TERM (CURRENT) USE OF ANTIMETABOLITE AGENT: ICD-10-CM

## 2024-04-30 DIAGNOSIS — I10 ESSENTIAL (PRIMARY) HYPERTENSION: ICD-10-CM

## 2024-04-30 DIAGNOSIS — Z79.60 LONG TERM (CURRENT) USE OF UNSPECIFIED IMMUNOMODULATORS AND IMMUNOSUPPRESSANTS: ICD-10-CM

## 2024-04-30 DIAGNOSIS — M81.0 AGE-RELATED OSTEOPOROSIS W/OUT CURRENT PATHOLOGICAL FRACTURE: ICD-10-CM

## 2024-04-30 PROCEDURE — G2211 COMPLEX E/M VISIT ADD ON: CPT

## 2024-04-30 PROCEDURE — 99214 OFFICE O/P EST MOD 30 MIN: CPT

## 2024-04-30 RX ORDER — AZITHROMYCIN 250 MG/1
250 TABLET, FILM COATED ORAL
Qty: 1 | Refills: 5 | Status: ACTIVE | COMMUNITY
Start: 2024-04-30 | End: 1900-01-01

## 2024-04-30 NOTE — ASSESSMENT
[FreeTextEntry1] : 1.  RA: chronic erosive and deforming disease, which has been treated with a variety of medicines.  Most recently Rituxan (last dose 10/22/2012) with a nice response.   Currently with pain in the right wrist as well as the the right ankle (same as previous visits). She is tolerating her medicines.  Her other joints are stable.  She feels that her disease is stable at the moment.  However, the damaged ankle/foot is responsible for increasing pain and the use of a cane.  She doesn't want at this point to think about surgery. In the fall 2016 she developed polyarticular pain and desired additional medicines. This flare responded to low dose prednisone, which she took for many weeks and then tapered off. She navigated a long trip through [place] and did quite well. Since that time she has traveled several times without incident (until COVID). Related to COVID, she stopped methotrexate in early 2020, but she noted no recrudescence of her RA. However, in late Aug 2021 she developed acute pain and swelling of the left wrist, which responded to low dose prednisone.  Whether this was an RA flare or perhaps pseudogout was unclear. She restarted methotrexate and has been quite content. In Oct 2022 she reported a flare in the left hand that spontaneously improved after a few days. After that time, her RA had been stable.  2.  Chronic methotrexate use: tolerated without dyspnea, GI symptoms, infection. She stopped methotrexate during COVID.  3.  Rituxan exposure: no infections or fevers 4.  ILD: no dyspnea 5.  Hypothyroidism 6.  Osteoporosis: had BMD (spine: osteopenia but values falsely elevated; hip could not be studied secondary to THR; wrist was osteoporotic-but this was a site a major involvement secondary to RA).  She was previously on Fosamax, but this was stopped because of a gastric or esophageal bleed. She received zoledronic acid 2015 through 2018.  Will hold off for now on additional zoledronic acid. A BMD in 2021 demonstrated normal values of the spine but abnormal values in the forearm (? effects of RA). A BMD in 2023 demonstrated osteoporosis of the wrist, but spine and hip were not performed. I am not sure that the wrist can be interpreted.  7.  Vitamin D deficiency 8.  Possible bursitis:  History of two falls (hit left hip and face-s/p stitches).  X-rays per patient x 2 negative.  Pain on L lateral thigh.  No bruising. 9.  Clavicular fracture: fell during a trip in the summer 2014 and fractured her clavicle.  She received care in [place] and has engaged in physical therapy.  10.  Right ankle arthritis: obtained new orthotic for the right foot.  Pain has increased, and she has resorted to the use of a cane.  11.  Rotator cuff tear:  left.  Occurred in early 2016.  An X-ray and MRI were performed.  Therapy at first was restricted to physical therapy.  However, she underwent surgery, which she considered to be quite successful.  12.  Neck pain:  onset in early June 2017 without radicular or myelopathic symptoms. No trauma.  13.  URI's: onset in mid-Dec 2017 and again in April 2018; another in Apr 2024 marked by cough without fever or chest pain 14.  Breast cancer: in 2005 15.  Right knee pain:  two episodes of self-limited right knee pain in the summer 2018 without a history of trauma. 16.  Shingles: episode in the fall 2019 affecting the left flank.  She still refuses vaccination. Another episode involving the left flank occurred in 2022.  She received an anti-viral.  17.  Foot surgery: surgery on the right foot on March 3, 2020 at [facility].  18.  RLE paresthesias: onset in 2021, and she was anxious to try gabapentin. 19.  Bilateral LE edema: she was evaluated by cardiology and nephrology in 2022 for the swelling in her legs. Neither subspecialist believed that there were cardiac or renal causes. She was seen by vascular in early Jan 2023. She has an appointment with cardiology in Jul 2023.  20.  Hypertension: medicines changed in 2023.   Plan Lab tests Reviewed internal and/or external records  Contact me [employer]-pack Rheumatoid Arthritis, referred to as RA, is a chronic autoimmune disease that can affect any organ in the body posing threats to proper organ function and even to life. Although the primary target are the joints, close surveillance of all bodily functions is required, including but not limited to the peripheral nervous system, ocular and auditory systems, cardiopulmonary function, kidney function, mucocutaneous and musculoskeletal systems as well as constitutional manifestations. Surveillance consists of history, physical, and laboratory tests. Treatment varies, but most of the drugs used are high risk and therefore also require close monitoring in the form of blood and urine tests. High risk medications used in the treatment of rheumatic diseases include steroids, disease modifying agents, immunosuppressive therapies, antimalarials, biologics, and chemotherapy. Regardless of which drug or class of drug, the potential toxicities of these therapies mandate close monitoring in the form of a history, physical, and laboratory tests. Return 3 months

## 2024-04-30 NOTE — HISTORY OF PRESENT ILLNESS
[FreeTextEntry1] : 1.  RA: chronic erosive and deforming disease, which has been treated with a variety of medicines.  Most recently Rituxan (last dose 10/22/2012) with a nice response.   Currently with pain in the right wrist as well as the the right ankle (same as previous visits). She is tolerating her medicines.  Her other joints are stable.  She feels that her disease is stable at the moment.  However, the damaged ankle/foot is responsible for increasing pain and the use of a cane.  She doesn't want at this point to think about surgery. In the fall 2016 she developed polyarticular pain and desired additional medicines. This flare responded to low dose prednisone, which she took for many weeks and then tapered off. She navigated a long trip through [place] and did quite well. Since that time she has traveled several times without incident (until COVID). Related to COVID, she stopped methotrexate in early 2020, but she noted no recrudescence of her RA. However, in late Aug 2021 she developed acute pain and swelling of the left wrist, which responded to low dose prednisone.  Whether this was an RA flare or perhaps pseudogout was unclear. She restarted methotrexate and has been quite content. In Oct 2022 she reported a flare in the left hand that spontaneously improved after a few days. After that time, her RA had been stable.  2.  Chronic methotrexate use: tolerated without dyspnea, GI symptoms, infection. She stopped methotrexate during COVID.  3.  Rituxan exposure: no infections or fevers 4.  ILD: no dyspnea 5.  Hypothyroidism 6.  Osteoporosis: had BMD (spine: osteopenia but values falsely elevated; hip could not be studied secondary to THR; wrist was osteoporotic-but this was a site a major involvement secondary to RA).  She was previously on Fosamax, but this was stopped because of a gastric or esophageal bleed. She received zoledronic acid 2015 through 2018.  Will hold off for now on additional zoledronic acid. A BMD in 2021 demonstrated normal values of the spine but abnormal values in the forearm (? effects of RA). A BMD in 2023 demonstrated osteoporosis of the wrist, but spine and hip were not performed. I am not sure that the wrist can be interpreted.  7.  Vitamin D deficiency 8.  Possible bursitis:  History of two falls (hit left hip and face-s/p stitches).  X-rays per patient x 2 negative.  Pain on L lateral thigh.  No bruising. 9.  Clavicular fracture: fell during a trip in the summer 2014 and fractured her clavicle.  She received care in [place] and has engaged in physical therapy.  10.  Right ankle arthritis: obtained new orthotic for the right foot.  Pain has increased, and she has resorted to the use of a cane.  11.  Rotator cuff tear:  left.  Occurred in early 2016.  An X-ray and MRI were performed.  Therapy at first was restricted to physical therapy.  However, she underwent surgery, which she considered to be quite successful.  12.  Neck pain:  onset in early June 2017 without radicular or myelopathic symptoms. No trauma.  13.  URI's: onset in mid-Dec 2017 and again in April 2018; another in Apr 2024 marked by cough without fever or chest pain 14.  Breast cancer: in 2005 15.  Right knee pain:  two episodes of self-limited right knee pain in the summer 2018 without a history of trauma. 16.  Shingles: episode in the fall 2019 affecting the left flank.  She still refuses vaccination. Another episode involving the left flank occurred in 2022.  She received an anti-viral.  17.  Foot surgery: surgery on the right foot on March 3, 2020 at [facility].  18.  RLE paresthesias: onset in 2021, and she was anxious to try gabapentin. 19.  Bilateral LE edema: she was evaluated by cardiology and nephrology in 2022 for the swelling in her legs. Neither subspecialist believed that there were cardiac or renal causes. She was seen by vascular in early Jan 2023. She has an appointment with cardiology in Jul 2023.  20.  Hypertension: medicines changed in 2023.   Meds 1.  Methotrexate 15 mg po weekly  2.  Atorvastatin 10 mg po daily 3.  Synthroid 0.05 mg po daily 4.  Folate 1 mg po daily 5.  Losartan 50 mg/d 6.  Hydrochlorothiazide 12.5 mg po daily 7.  Ca/D stopped 8.  Vitamin D 1xd 1000 IU/d stopped 9.  ASA 81 mg/d (patient stopped) stopped 10.  Eliquis 5 mg 2xd 11.  Zoledronic acid (last September 2018) 12.  Metoprolol 25 mg/d 13. Amlodipine 5 mg/d  Vaccines PNVX  1/12/2010 PNVX 23  12/5/16  ([id number]; exp 11Jan2018) PNVX 23  3/28/22 ([id number]; exp 7/17/23) Prevnar 13 valent 2/9/15 ([id number]; exp 5/16) Flu vaccine received elsewhere in 2014 Quadrivalent fluzone 11/23/15  ( AA; exp 6/16) Fluzone 9/6/16  ([id number]; exp 17MAR17) Fluzone HD  9/14/17  ( AA; exp 6APR2018) Fluzone HD 11/12/18  ( AA; exp 29APR2019) Fluzone Quadrivalent 12/06/2021 ( AB; exp 6/30/2022) Fluzone Quadrivalent 10/25/2022 ( AD; exp 6/30/2023) Fluzone Quadrivalent 10/31/23 ([id number]; exp 6/2024)

## 2024-04-30 NOTE — DISCUSSION/SUMMARY
[FreeTextEntry1] : 1.  RA: chronic erosive and deforming disease, which has been treated with a variety of medicines.  Most recently Rituxan (last dose 10/22/2012) with a nice response.   Currently with pain in the right wrist as well as the the right ankle (same as previous visits). She is tolerating her medicines.  Her other joints are stable.  She feels that her disease is stable at the moment.\par  2.  Chronic methotrexate use: tolerating without dyspnea, GI symptoms, infection\par  3.  Rituxan exposure: no infections or fevers\par  4.  ILD: no dyspnea\par  5.  Hypothyroidism\par  6.  Osteoporosis: had BMD (spine: osteopenia but values falsely elevated; hip could not be studied secondary to THR; wrist was osteoporotic-but this was a site a major involvement secondary to RA).  She was previously on Fosamax, but this was stopped because of a gastric or esophageal bleed. \par  7.  Vitamin D deficiency\par  8.  Possible bursitis:  History of two falls (hit left hip and face-s/p stitches).  X-rays per patient x 2 negative.  Pain on L lateral thigh.  No bruising.\par  9.  Clavicular fracture: fell during a trip in the summer 2014 and fractured her clavicle.  She received care in [place] and has engaged in physical therapy. \par  10.  Right ankle arthritis: obtained new orthotic for the right foot.  It is too early to determine its effectiveness.\par  11.  Peripheral vascular disease:  underwent stenting in the right LE.\par  \par  Plan\par  1.  Continue MTX 15 mg weekly\par  2.  Labs today\par  3.  Consider repeat Rituxan; however, I would prefer to wait until there is a significant flare\par  4. Return 3 months

## 2024-04-30 NOTE — PHYSICAL EXAM
[General Appearance - Alert] : alert [General Appearance - In No Acute Distress] : in no acute distress [Sclera] : the sclera and conjunctiva were normal [Extraocular Movements] : extraocular movements were intact [Outer Ear] : the ears and nose were normal in appearance [Examination Of The Oral Cavity] : the lips and gums were normal [Oropharynx] : the oropharynx was normal [Exaggerated Use Of Accessory Muscles For Inspiration] : no accessory muscle use [Heart Rate And Rhythm] : heart rate was normal and rhythm regular [Heart Sounds] : normal S1 and S2 [Abdomen Soft] : soft [Abdomen Tenderness] : non-tender [Cervical Lymph Nodes Enlarged Posterior Bilaterally] : posterior cervical [Cervical Lymph Nodes Enlarged Anterior Bilaterally] : anterior cervical [Supraclavicular Lymph Nodes Enlarged Bilaterally] : supraclavicular [No CVA Tenderness] : no ~M costovertebral angle tenderness [No Spinal Tenderness] : no spinal tenderness [] : no rash [Sensation] : the sensory exam was normal to light touch and pinprick [Oriented To Time, Place, And Person] : oriented to person, place, and time [FreeTextEntry1] : limited left shoulder motion but pain is minimal; limited ROM of the wrists R>L; right knee cool and without effusion; tender subtalar area right foot; very poor subtalar motion bilaterally

## 2024-05-01 LAB
25(OH)D3 SERPL-MCNC: 32.5 NG/ML
ALBUMIN SERPL ELPH-MCNC: 4.2 G/DL
ALP BLD-CCNC: 125 U/L
ALT SERPL-CCNC: 19 U/L
ANION GAP SERPL CALC-SCNC: 13 MMOL/L
AST SERPL-CCNC: 28 U/L
BASOPHILS # BLD AUTO: 0.07 K/UL
BASOPHILS NFR BLD AUTO: 1.2 %
BILIRUB SERPL-MCNC: 1.4 MG/DL
BUN SERPL-MCNC: 15 MG/DL
CALCIUM SERPL-MCNC: 9 MG/DL
CHLORIDE SERPL-SCNC: 100 MMOL/L
CO2 SERPL-SCNC: 26 MMOL/L
CREAT SERPL-MCNC: 1.1 MG/DL
CRP SERPL-MCNC: 36 MG/L
EGFR: 50 ML/MIN/1.73M2
EOSINOPHIL # BLD AUTO: 0.18 K/UL
EOSINOPHIL NFR BLD AUTO: 3 %
HCT VFR BLD CALC: 33.5 %
HGB BLD-MCNC: 10.9 G/DL
IMM GRANULOCYTES NFR BLD AUTO: 0.5 %
LYMPHOCYTES # BLD AUTO: 0.68 K/UL
LYMPHOCYTES NFR BLD AUTO: 11.5 %
MAN DIFF?: NORMAL
MCHC RBC-ENTMCNC: 32.5 GM/DL
MCHC RBC-ENTMCNC: 32.8 PG
MCV RBC AUTO: 100.9 FL
MONOCYTES # BLD AUTO: 0.67 K/UL
MONOCYTES NFR BLD AUTO: 11.3 %
NEUTROPHILS # BLD AUTO: 4.28 K/UL
NEUTROPHILS NFR BLD AUTO: 72.5 %
PLATELET # BLD AUTO: 220 K/UL
POTASSIUM SERPL-SCNC: 3.5 MMOL/L
PROT SERPL-MCNC: 6.5 G/DL
RBC # BLD: 3.32 M/UL
RBC # FLD: 18.4 %
SODIUM SERPL-SCNC: 138 MMOL/L
WBC # FLD AUTO: 5.91 K/UL

## 2024-05-03 ENCOUNTER — INPATIENT (INPATIENT)
Facility: HOSPITAL | Age: 84
LOS: 4 days | Discharge: HOME HEALTH | End: 2024-05-08
Attending: INTERNAL MEDICINE | Admitting: INTERNAL MEDICINE
Payer: MEDICARE

## 2024-05-03 VITALS
HEART RATE: 100 BPM | DIASTOLIC BLOOD PRESSURE: 74 MMHG | SYSTOLIC BLOOD PRESSURE: 203 MMHG | OXYGEN SATURATION: 90 % | WEIGHT: 160.06 LBS | TEMPERATURE: 98 F | HEIGHT: 65 IN | RESPIRATION RATE: 20 BRPM

## 2024-05-03 DIAGNOSIS — Z98.890 OTHER SPECIFIED POSTPROCEDURAL STATES: Chronic | ICD-10-CM

## 2024-05-03 PROCEDURE — 99291 CRITICAL CARE FIRST HOUR: CPT

## 2024-05-03 NOTE — ED ADULT TRIAGE NOTE - CHIEF COMPLAINT QUOTE
pt here for diff breathing, congestion and coughing.  Pt came back from the [facility]'s x1 week ago on 4/26/2024.  seen by rhuematologist 4 days ago and was rx Azithromycin, on day 4.   hx of HTN, Afib, HLD.  allergy to codeine

## 2024-05-04 DIAGNOSIS — J18.9 PNEUMONIA, UNSPECIFIED ORGANISM: ICD-10-CM

## 2024-05-04 DIAGNOSIS — I10 ESSENTIAL (PRIMARY) HYPERTENSION: ICD-10-CM

## 2024-05-04 DIAGNOSIS — M06.9 RHEUMATOID ARTHRITIS, UNSPECIFIED: ICD-10-CM

## 2024-05-04 DIAGNOSIS — I48.91 UNSPECIFIED ATRIAL FIBRILLATION: ICD-10-CM

## 2024-05-04 LAB
ALBUMIN SERPL ELPH-MCNC: 3.3 G/DL — SIGNIFICANT CHANGE UP (ref 3.3–5)
ALP SERPL-CCNC: 106 U/L — SIGNIFICANT CHANGE UP (ref 40–120)
ALT FLD-CCNC: 29 U/L — SIGNIFICANT CHANGE UP (ref 12–78)
ANION GAP SERPL CALC-SCNC: 6 MMOL/L — SIGNIFICANT CHANGE UP (ref 5–17)
AST SERPL-CCNC: 36 U/L — SIGNIFICANT CHANGE UP (ref 15–37)
BASOPHILS # BLD AUTO: 0.06 K/UL — SIGNIFICANT CHANGE UP (ref 0–0.2)
BASOPHILS NFR BLD AUTO: 0.8 % — SIGNIFICANT CHANGE UP (ref 0–2)
BILIRUB SERPL-MCNC: 1.6 MG/DL — HIGH (ref 0.2–1.2)
BUN SERPL-MCNC: 13 MG/DL — SIGNIFICANT CHANGE UP (ref 7–23)
CALCIUM SERPL-MCNC: 8.7 MG/DL — SIGNIFICANT CHANGE UP (ref 8.5–10.1)
CHLORIDE SERPL-SCNC: 98 MMOL/L — SIGNIFICANT CHANGE UP (ref 96–108)
CO2 SERPL-SCNC: 28 MMOL/L — SIGNIFICANT CHANGE UP (ref 22–31)
CREAT SERPL-MCNC: 0.97 MG/DL — SIGNIFICANT CHANGE UP (ref 0.5–1.3)
EGFR: 58 ML/MIN/1.73M2 — LOW
EOSINOPHIL # BLD AUTO: 0.06 K/UL — SIGNIFICANT CHANGE UP (ref 0–0.5)
EOSINOPHIL NFR BLD AUTO: 0.8 % — SIGNIFICANT CHANGE UP (ref 0–6)
FLUAV AG NPH QL: SIGNIFICANT CHANGE UP
FLUBV AG NPH QL: SIGNIFICANT CHANGE UP
GLUCOSE BLDC GLUCOMTR-MCNC: 96 MG/DL — SIGNIFICANT CHANGE UP (ref 70–99)
GLUCOSE SERPL-MCNC: 118 MG/DL — HIGH (ref 70–99)
HCT VFR BLD CALC: 34.1 % — LOW (ref 34.5–45)
HGB BLD-MCNC: 10.9 G/DL — LOW (ref 11.5–15.5)
HMPV RNA SPEC QL NAA+PROBE: DETECTED
IMM GRANULOCYTES NFR BLD AUTO: 0.9 % — SIGNIFICANT CHANGE UP (ref 0–0.9)
LEGIONELLA AG UR QL: NEGATIVE — SIGNIFICANT CHANGE UP
LYMPHOCYTES # BLD AUTO: 1.06 K/UL — SIGNIFICANT CHANGE UP (ref 1–3.3)
LYMPHOCYTES # BLD AUTO: 13.7 % — SIGNIFICANT CHANGE UP (ref 13–44)
MCHC RBC-ENTMCNC: 31.1 PG — SIGNIFICANT CHANGE UP (ref 27–34)
MCHC RBC-ENTMCNC: 32 G/DL — SIGNIFICANT CHANGE UP (ref 32–36)
MCV RBC AUTO: 97.2 FL — SIGNIFICANT CHANGE UP (ref 80–100)
MONOCYTES # BLD AUTO: 1 K/UL — HIGH (ref 0–0.9)
MONOCYTES NFR BLD AUTO: 12.9 % — SIGNIFICANT CHANGE UP (ref 2–14)
NEUTROPHILS # BLD AUTO: 5.49 K/UL — SIGNIFICANT CHANGE UP (ref 1.8–7.4)
NEUTROPHILS NFR BLD AUTO: 70.9 % — SIGNIFICANT CHANGE UP (ref 43–77)
NRBC # BLD: 0 /100 WBCS — SIGNIFICANT CHANGE UP (ref 0–0)
PLATELET # BLD AUTO: 194 K/UL — SIGNIFICANT CHANGE UP (ref 150–400)
POTASSIUM SERPL-MCNC: 3.3 MMOL/L — LOW (ref 3.5–5.3)
POTASSIUM SERPL-SCNC: 3.3 MMOL/L — LOW (ref 3.5–5.3)
PROT SERPL-MCNC: 7.1 GM/DL — SIGNIFICANT CHANGE UP (ref 6–8.3)
RAPID RVP RESULT: DETECTED
RBC # BLD: 3.51 M/UL — LOW (ref 3.8–5.2)
RBC # FLD: 17.9 % — HIGH (ref 10.3–14.5)
S PNEUM AG UR QL: NEGATIVE — SIGNIFICANT CHANGE UP
SARS-COV-2 RNA SPEC QL NAA+PROBE: SIGNIFICANT CHANGE UP
SARS-COV-2 RNA SPEC QL NAA+PROBE: SIGNIFICANT CHANGE UP
SODIUM SERPL-SCNC: 132 MMOL/L — LOW (ref 135–145)
WBC # BLD: 7.74 K/UL — SIGNIFICANT CHANGE UP (ref 3.8–10.5)
WBC # FLD AUTO: 7.74 K/UL — SIGNIFICANT CHANGE UP (ref 3.8–10.5)

## 2024-05-04 PROCEDURE — 99222 1ST HOSP IP/OBS MODERATE 55: CPT

## 2024-05-04 PROCEDURE — 93010 ELECTROCARDIOGRAM REPORT: CPT

## 2024-05-04 PROCEDURE — 71046 X-RAY EXAM CHEST 2 VIEWS: CPT | Mod: 26

## 2024-05-04 RX ORDER — LOSARTAN POTASSIUM 100 MG/1
100 TABLET, FILM COATED ORAL DAILY
Refills: 0 | Status: DISCONTINUED | OUTPATIENT
Start: 2024-05-04 | End: 2024-05-08

## 2024-05-04 RX ORDER — LANOLIN ALCOHOL/MO/W.PET/CERES
3 CREAM (GRAM) TOPICAL AT BEDTIME
Refills: 0 | Status: DISCONTINUED | OUTPATIENT
Start: 2024-05-04 | End: 2024-05-08

## 2024-05-04 RX ORDER — FUROSEMIDE 40 MG
20 TABLET ORAL DAILY
Refills: 0 | Status: DISCONTINUED | OUTPATIENT
Start: 2024-05-04 | End: 2024-05-04

## 2024-05-04 RX ORDER — ATORVASTATIN CALCIUM 80 MG/1
1 TABLET, FILM COATED ORAL
Qty: 0 | Refills: 0 | DISCHARGE

## 2024-05-04 RX ORDER — ONDANSETRON 8 MG/1
4 TABLET, FILM COATED ORAL EVERY 8 HOURS
Refills: 0 | Status: DISCONTINUED | OUTPATIENT
Start: 2024-05-04 | End: 2024-05-08

## 2024-05-04 RX ORDER — ATORVASTATIN CALCIUM 80 MG/1
10 TABLET, FILM COATED ORAL AT BEDTIME
Refills: 0 | Status: DISCONTINUED | OUTPATIENT
Start: 2024-05-04 | End: 2024-05-08

## 2024-05-04 RX ORDER — AZITHROMYCIN 500 MG/1
500 TABLET, FILM COATED ORAL EVERY 24 HOURS
Refills: 0 | Status: DISCONTINUED | OUTPATIENT
Start: 2024-05-04 | End: 2024-05-06

## 2024-05-04 RX ORDER — METHOTREXATE 2.5 MG/1
15 TABLET ORAL
Refills: 0 | Status: DISCONTINUED | OUTPATIENT
Start: 2024-05-04 | End: 2024-05-08

## 2024-05-04 RX ORDER — AZITHROMYCIN 500 MG/1
500 TABLET, FILM COATED ORAL ONCE
Refills: 0 | Status: COMPLETED | OUTPATIENT
Start: 2024-05-04 | End: 2024-05-04

## 2024-05-04 RX ORDER — ACETAMINOPHEN 500 MG
650 TABLET ORAL EVERY 6 HOURS
Refills: 0 | Status: DISCONTINUED | OUTPATIENT
Start: 2024-05-04 | End: 2024-05-08

## 2024-05-04 RX ORDER — POTASSIUM CHLORIDE 20 MEQ
40 PACKET (EA) ORAL ONCE
Refills: 0 | Status: COMPLETED | OUTPATIENT
Start: 2024-05-04 | End: 2024-05-04

## 2024-05-04 RX ORDER — APIXABAN 2.5 MG/1
5 TABLET, FILM COATED ORAL EVERY 12 HOURS
Refills: 0 | Status: DISCONTINUED | OUTPATIENT
Start: 2024-05-04 | End: 2024-05-08

## 2024-05-04 RX ORDER — CEFTRIAXONE 500 MG/1
1000 INJECTION, POWDER, FOR SOLUTION INTRAMUSCULAR; INTRAVENOUS EVERY 24 HOURS
Refills: 0 | Status: COMPLETED | OUTPATIENT
Start: 2024-05-04 | End: 2024-05-08

## 2024-05-04 RX ORDER — CEFTRIAXONE 500 MG/1
1000 INJECTION, POWDER, FOR SOLUTION INTRAMUSCULAR; INTRAVENOUS ONCE
Refills: 0 | Status: COMPLETED | OUTPATIENT
Start: 2024-05-04 | End: 2024-05-04

## 2024-05-04 RX ORDER — CLOPIDOGREL BISULFATE 75 MG/1
1 TABLET, FILM COATED ORAL
Qty: 0 | Refills: 0 | DISCHARGE

## 2024-05-04 RX ORDER — METOPROLOL TARTRATE 50 MG
25 TABLET ORAL
Refills: 0 | Status: DISCONTINUED | OUTPATIENT
Start: 2024-05-04 | End: 2024-05-08

## 2024-05-04 RX ORDER — GUAIFENESIN/DEXTROMETHORPHAN 600MG-30MG
10 TABLET, EXTENDED RELEASE 12 HR ORAL EVERY 4 HOURS
Refills: 0 | Status: DISCONTINUED | OUTPATIENT
Start: 2024-05-04 | End: 2024-05-08

## 2024-05-04 RX ORDER — GABAPENTIN 400 MG/1
300 CAPSULE ORAL
Refills: 0 | Status: DISCONTINUED | OUTPATIENT
Start: 2024-05-04 | End: 2024-05-08

## 2024-05-04 RX ORDER — LEVOTHYROXINE SODIUM 125 MCG
50 TABLET ORAL DAILY
Refills: 0 | Status: DISCONTINUED | OUTPATIENT
Start: 2024-05-04 | End: 2024-05-08

## 2024-05-04 RX ORDER — AMLODIPINE BESYLATE 2.5 MG/1
1 TABLET ORAL
Qty: 0 | Refills: 0 | DISCHARGE

## 2024-05-04 RX ADMIN — AZITHROMYCIN 500 MILLIGRAM(S): 500 TABLET, FILM COATED ORAL at 06:06

## 2024-05-04 RX ADMIN — GABAPENTIN 300 MILLIGRAM(S): 400 CAPSULE ORAL at 17:20

## 2024-05-04 RX ADMIN — Medication 40 MILLIEQUIVALENT(S): at 09:14

## 2024-05-04 RX ADMIN — GABAPENTIN 300 MILLIGRAM(S): 400 CAPSULE ORAL at 06:07

## 2024-05-04 RX ADMIN — Medication 25 MILLIGRAM(S): at 06:07

## 2024-05-04 RX ADMIN — Medication 25 MILLIGRAM(S): at 17:20

## 2024-05-04 RX ADMIN — APIXABAN 5 MILLIGRAM(S): 2.5 TABLET, FILM COATED ORAL at 06:06

## 2024-05-04 RX ADMIN — ATORVASTATIN CALCIUM 10 MILLIGRAM(S): 80 TABLET, FILM COATED ORAL at 06:07

## 2024-05-04 RX ADMIN — LOSARTAN POTASSIUM 100 MILLIGRAM(S): 100 TABLET, FILM COATED ORAL at 06:06

## 2024-05-04 RX ADMIN — ATORVASTATIN CALCIUM 10 MILLIGRAM(S): 80 TABLET, FILM COATED ORAL at 22:44

## 2024-05-04 RX ADMIN — CEFTRIAXONE 100 MILLIGRAM(S): 500 INJECTION, POWDER, FOR SOLUTION INTRAMUSCULAR; INTRAVENOUS at 06:14

## 2024-05-04 RX ADMIN — APIXABAN 5 MILLIGRAM(S): 2.5 TABLET, FILM COATED ORAL at 17:20

## 2024-05-04 NOTE — ED PROVIDER NOTE - OBJECTIVE STATEMENT
elderly female pmh rheumatoid arthritis, AFib (on eliquis) presenting w/ cough, dyspnea x 5 days. no alleviation after prescribed azithromycin by rheumatologist

## 2024-05-04 NOTE — ED PROVIDER NOTE - CRITICAL CARE ATTENDING CONTRIBUTION TO CARE
elderly female pmh rheumatoid arthritis, AFib (on eliquis) presenting w/ cough, dyspnea    hypoxia to 89% tachypnea to 30 breaths/min  EKG w/ afib  CXR concerning for RLL PNA, low concern for PE given radiographic findings and pt on eliquis  abx  acute respiratory distress requiring high flow nasal cannula, considered BIPAP and possible intubation but marked improvement in respiratory rate and hypoxia of HFNC  medicine admit

## 2024-05-04 NOTE — ED PROVIDER NOTE - FAMILY DETAILS FREE TEXT FOR MDM ADDL HISTORY OBTAINED FROM QUESTION
sister states that patient lives alone and she has not had any improvement in symptoms over the past few days

## 2024-05-04 NOTE — ED ADULT NURSE NOTE - NSFALLUNIVINTERV_ED_ALL_ED
Dr Evangelista
MICU
Bed/Stretcher in lowest position, wheels locked, appropriate side rails in place/Call bell, personal items and telephone in reach/Instruct patient to call for assistance before getting out of bed/chair/stretcher/Non-slip footwear applied when patient is off stretcher/[place] to call system/Physically safe environment - no spills, clutter or unnecessary equipment/Purposeful proactive rounding/Room/bathroom lighting operational, light cord in reach

## 2024-05-04 NOTE — H&P ADULT - NSHPPHYSICALEXAM_GEN_ALL_CORE
VITALS:   T(C): 36.9 (05-04-24 @ 00:00), Max: 36.9 (05-04-24 @ 00:00)  HR: 73 (05-04-24 @ 06:17) (8 - 100)  BP: 155/83 (05-04-24 @ 06:17) (155/83 - 203/74)  RR: 27 (05-04-24 @ 06:17) (18 - 28)  SpO2: 96% (05-04-24 @ 06:17) (90% - 98%)    GENERAL: NAD, high flow NC in place.   HEAD:  Atraumatic, Normocephalic  EYES: EOMI, PERRLA, conjunctiva and sclera clear  ENT: Moist mucous membranes  NECK: Supple, No JVD  CHEST/LUNG: + rales, rhonchi, - wheezing, or rubs.+ tachypnic   HEART: Regular rate and rhythm; No murmurs, rubs, or gallops  ABDOMEN: BSx4; Soft, nontender, nondistended  EXTREMITIES:  2+ Peripheral Pulses, brisk capillary refill. No clubbing, cyanosis, or edema  NERVOUS SYSTEM:  A&Ox3, no focal deficits   SKIN: No rashes or lesions

## 2024-05-04 NOTE — H&P ADULT - HISTORY OF PRESENT ILLNESS
83 year old female with Rheumatoid arthritis, HTN, A fib on Eliquis,  RLE stent(for PAD) , Right  Breast CA S/P Lumpectomy +Leidy dissection followed by Chem and radiation here for complaint of cough with congestion x 1 week. Per pt she has been having severe sob with with coughing and breathing fast and heavy.     In ED pt was placed on HFNC for tachypnia and  increased work of breathing.

## 2024-05-04 NOTE — ED ADULT NURSE NOTE - CHIEF COMPLAINT QUOTE
pt here for diff breathing, congestion and coughing.  Pt came back from the [facility]'s x1 week ago on 4/26/2024.  seen by rhuematologist 4 days ago and was rx Azithromycin, on day 4.   hx of HTN, Afib, HLD.  allergy to codeine

## 2024-05-04 NOTE — ED ADULT NURSE NOTE - OBJECTIVE STATEMENT
Pt endorses coughing and SOB x 1 week but increasingly getting worse. Pt is alert and oriented, Pt endorse chest pain with coughing. Pt denies Chills, N/V. Pt has med history of AFib on Eliquis. Hx of HTN and HLD.

## 2024-05-04 NOTE — H&P ADULT - ASSESSMENT
83 year old female with Rheumatoid arthritis, HTN, A fib on Eliquis,  RLE stent(for PAD) , Right  Breast CA S/P Lumpectomy +Leidy dissection followed by Chem and radiation here for complaint of cough with congestion x 1 week. Per pt she has been having severe sob with with coughing and breathing fast and heavy. In ED pt was placed on HFNC for tachypnia and  increased work of breathing.  Admit for pna.

## 2024-05-04 NOTE — ED ADULT NURSE NOTE - ED STAT RN HANDOFF DETAILS
report given to ED Hold ACOSTA Noriega. patient alert and oriented x3, denies pain or discomfort. VS stable, in no acute distress, safety precautions maintained. fall precautions maintained. pt on high flow nasal cannula. waiting for RVP to result.

## 2024-05-04 NOTE — ED ADULT NURSE REASSESSMENT NOTE - NS ED NURSE REASSESS COMMENT FT1
pt received by night RN. patient alert and oriented x3, denies pain or discomfort. VS stable, in no acute distress, safety precautions maintained. fall precautions maintained. pt on continuos cardiac monitor and pulse ox. pt has on high flow nasal cannula. pt admitted, 1B called x1.

## 2024-05-04 NOTE — PATIENT PROFILE ADULT - FALL HARM RISK - RISK INTERVENTIONS

## 2024-05-04 NOTE — H&P ADULT - PROBLEM SELECTOR PLAN 1
ceftriaxone + azithromycin  legionella + strep pnumo  Sputum culture  HFNC for now downtitrate based on pts comfort.

## 2024-05-04 NOTE — PATIENT PROFILE ADULT - TRANSPORTATION
54 year old female noted with PNA and metabolic encephalopathy.    Supporting Documentation :    2/3 Possible Sepsis Workup in ED  2/4 Hematology note: possible sepsis  2/4 ID Note: Febrile syndrome. Possible sepsis. Bibasilar lower lobes infiltrates. Likely pneumonia. Pyuria. Possible UTI.  2/3 H/P : Suspected UTI - present on admission with hx of suprapubic catheter/ neurogenic bladder with Suprapubic catheter and UTI  2/4 Hospitalist Note: neurogenic bladder with Suprapubic catheter and UTI  Vital signs on admission 136/77, , RR 22-27, 102.3 Temp, 95% spo2 on R/A    Can you please clarify the status of sepsis and if above clinical indicators can support a further diagnosis?  A) Sepsis ruled in and present on admission 2/2 PNA/UTI  B) Sepsis ruled in and present on admission 2/2 to UTI related to/due to Suprapubic catheter  C) Sepsis ruled out, UTI only, related to/due to Suprapubic catheter  D) Sepsis ruled out, UTI only, unrelated to/due to Suprapubic catheter  E) Other, please specify  F) Not clinically significant no

## 2024-05-04 NOTE — ED PROVIDER NOTE - CONSIDERATION OF ADMISSION OBSERVATION
Consideration of Admission/Observation will admit for cough, hypoxia and weakness. PT requiring HFNC for tachypnea and hypoxia in setting of PNA

## 2024-05-04 NOTE — PATIENT PROFILE ADULT - LIVING ENVIRONMENT
----- Message from Marcelo Benito MD sent at 3/2/2024  5:43 PM CST -----  Please share results with patient and empahsize to take imiquimod cream regularly. Also she needs to f/u with Dr Hollins.   F/u with me in 2 months.  ----- Message -----  From: Lab, Background User  Sent: 2/29/2024   1:46 PM CST  To: Marcelo Benito MD       no

## 2024-05-04 NOTE — H&P ADULT - NSHPLABSRESULTS_GEN_ALL_CORE
=======================================================  Labs:                        10.9   7.74  )-----------( 194      ( 04 May 2024 02:38 )             34.1     05-04    132<L>  |  98  |  13  ----------------------------<  118<H>  3.3<L>   |  28  |  0.97    Ca    8.7      04 May 2024 02:38    TPro  7.1  /  Alb  3.3  /  TBili  1.6<H>  /  DBili  x   /  AST  36  /  ALT  29  /  AlkPhos  106  05-04      Creatinine: 0.97 mg/dL (05-04-24 @ 02:38)            WBC Count: 7.74 K/uL (05-04-24 @ 02:38)    SARS-CoV-2 Result: NotDetec (05-04-24 @ 02:38)      Alkaline Phosphatase: 106 U/L (05-04-24 @ 02:38)  Alanine Aminotransferase (ALT/SGPT): 29 U/L (05-04-24 @ 02:38)  Aspartate Aminotransferase (AST/SGOT): 36 U/L (05-04-24 @ 02:38)  Bilirubin Total: 1.6 mg/dL (05-04-24 @ 02:38)

## 2024-05-04 NOTE — ED PROVIDER NOTE - PHYSICAL EXAMINATION
General: Well appearing elderly female in no acute distress  HEENT: Normocephalic, atraumatic. Moist mucous membranes. Oropharynx clear. No lymphadenopathy.  Eyes: No scleral icterus. EOMI. NOEMY.  Neck:. Soft and supple. Full ROM without pain. No midline tenderness  Cardiac: irregular . No murmurs, rubs, gallops. Peripheral pulses 2+ and symmetric. No LE edema.  Resp: intermittent coughing. Speaking in full sentences. + rhonchi with diffuse rales  Abd: Soft, non-tender, non-distended. No guarding or rebound. No scars, masses, or lesions.  Back: Spine midline and non-tender. No CVA tenderness.    Skin: No rashes, abrasions, or lacerations.  Neuro: AO x 3. Moves all extremities symmetrically. Motor strength and sensation grossly intact.

## 2024-05-04 NOTE — ED PROVIDER NOTE - CLINICAL SUMMARY MEDICAL DECISION MAKING FREE TEXT BOX
elderly female pmh rheumatoid arthritis, AFib (on eliquis) presenting w/ cough, dyspnea    hypoxia to 89% tachypnea to 30 breaths/min  EKG w/ afib  CXR concerning for RLL PNA  abx, HFNC  medicine admit elderly female pmh rheumatoid arthritis, AFib (on eliquis) presenting w/ cough, dyspnea    hypoxia to 89% tachypnea to 30 breaths/min  EKG w/ afib  CXR concerning for RLL PNA, low concern for PE given radiographic findings and pt on eliquis  abx, HFNC  medicine admit

## 2024-05-04 NOTE — H&P ADULT - NSHPREVIEWOFSYSTEMS_GEN_ALL_CORE
REVIEW OF SYSTEMS:    CONSTITUTIONAL: No weakness, fevers or chills  EYES/ENT: No visual changes;  No vertigo or throat pain   NECK: No pain or stiffness  RESPIRATORY: +cough, - wheezing, hemoptysis; + shortness of breath  CARDIOVASCULAR: No chest pain or palpitations  GASTROINTESTINAL: No abdominal or epigastric pain. No nausea, vomiting, or hematemesis; No diarrhea or constipation. No melena or hematochezia.  GENITOURINARY: No dysuria, frequency or hematuria  NEUROLOGICAL: No numbness or weakness  SKIN: No itching, rashes

## 2024-05-05 LAB
ALBUMIN SERPL ELPH-MCNC: 2.5 G/DL — LOW (ref 3.3–5)
ALP SERPL-CCNC: 83 U/L — SIGNIFICANT CHANGE UP (ref 40–120)
ALT FLD-CCNC: 27 U/L — SIGNIFICANT CHANGE UP (ref 12–78)
ANION GAP SERPL CALC-SCNC: 7 MMOL/L — SIGNIFICANT CHANGE UP (ref 5–17)
AST SERPL-CCNC: 38 U/L — HIGH (ref 15–37)
BILIRUB SERPL-MCNC: 0.7 MG/DL — SIGNIFICANT CHANGE UP (ref 0.2–1.2)
BUN SERPL-MCNC: 12 MG/DL — SIGNIFICANT CHANGE UP (ref 7–23)
CALCIUM SERPL-MCNC: 8.1 MG/DL — LOW (ref 8.5–10.1)
CHLORIDE SERPL-SCNC: 102 MMOL/L — SIGNIFICANT CHANGE UP (ref 96–108)
CO2 SERPL-SCNC: 27 MMOL/L — SIGNIFICANT CHANGE UP (ref 22–31)
CREAT SERPL-MCNC: 0.85 MG/DL — SIGNIFICANT CHANGE UP (ref 0.5–1.3)
EGFR: 68 ML/MIN/1.73M2 — SIGNIFICANT CHANGE UP
GLUCOSE SERPL-MCNC: 106 MG/DL — HIGH (ref 70–99)
HCT VFR BLD CALC: 29.3 % — LOW (ref 34.5–45)
HGB BLD-MCNC: 9.7 G/DL — LOW (ref 11.5–15.5)
MAGNESIUM SERPL-MCNC: 2.3 MG/DL — SIGNIFICANT CHANGE UP (ref 1.6–2.6)
MCHC RBC-ENTMCNC: 31.8 PG — SIGNIFICANT CHANGE UP (ref 27–34)
MCHC RBC-ENTMCNC: 33.1 G/DL — SIGNIFICANT CHANGE UP (ref 32–36)
MCV RBC AUTO: 96.1 FL — SIGNIFICANT CHANGE UP (ref 80–100)
NRBC # BLD: 0 /100 WBCS — SIGNIFICANT CHANGE UP (ref 0–0)
PHOSPHATE SERPL-MCNC: 2.7 MG/DL — SIGNIFICANT CHANGE UP (ref 2.5–4.5)
PLATELET # BLD AUTO: 175 K/UL — SIGNIFICANT CHANGE UP (ref 150–400)
POTASSIUM SERPL-MCNC: 3.4 MMOL/L — LOW (ref 3.5–5.3)
POTASSIUM SERPL-SCNC: 3.4 MMOL/L — LOW (ref 3.5–5.3)
PROT SERPL-MCNC: 5.8 GM/DL — LOW (ref 6–8.3)
RBC # BLD: 3.05 M/UL — LOW (ref 3.8–5.2)
RBC # FLD: 17.2 % — HIGH (ref 10.3–14.5)
SODIUM SERPL-SCNC: 136 MMOL/L — SIGNIFICANT CHANGE UP (ref 135–145)
WBC # BLD: 6.15 K/UL — SIGNIFICANT CHANGE UP (ref 3.8–10.5)
WBC # FLD AUTO: 6.15 K/UL — SIGNIFICANT CHANGE UP (ref 3.8–10.5)

## 2024-05-05 PROCEDURE — 99233 SBSQ HOSP IP/OBS HIGH 50: CPT

## 2024-05-05 RX ORDER — POTASSIUM CHLORIDE 20 MEQ
40 PACKET (EA) ORAL ONCE
Refills: 0 | Status: COMPLETED | OUTPATIENT
Start: 2024-05-05 | End: 2024-05-05

## 2024-05-05 RX ORDER — BUDESONIDE AND FORMOTEROL FUMARATE DIHYDRATE 160; 4.5 UG/1; UG/1
2 AEROSOL RESPIRATORY (INHALATION)
Refills: 0 | Status: DISCONTINUED | OUTPATIENT
Start: 2024-05-05 | End: 2024-05-08

## 2024-05-05 RX ORDER — ALBUTEROL 90 UG/1
2 AEROSOL, METERED ORAL EVERY 6 HOURS
Refills: 0 | Status: DISCONTINUED | OUTPATIENT
Start: 2024-05-05 | End: 2024-05-08

## 2024-05-05 RX ADMIN — Medication 50 MICROGRAM(S): at 05:26

## 2024-05-05 RX ADMIN — BUDESONIDE AND FORMOTEROL FUMARATE DIHYDRATE 2 PUFF(S): 160; 4.5 AEROSOL RESPIRATORY (INHALATION) at 18:53

## 2024-05-05 RX ADMIN — GABAPENTIN 300 MILLIGRAM(S): 400 CAPSULE ORAL at 18:02

## 2024-05-05 RX ADMIN — GABAPENTIN 300 MILLIGRAM(S): 400 CAPSULE ORAL at 05:26

## 2024-05-05 RX ADMIN — Medication 40 MILLIEQUIVALENT(S): at 08:43

## 2024-05-05 RX ADMIN — ATORVASTATIN CALCIUM 10 MILLIGRAM(S): 80 TABLET, FILM COATED ORAL at 22:08

## 2024-05-05 RX ADMIN — Medication 25 MILLIGRAM(S): at 18:02

## 2024-05-05 RX ADMIN — Medication 25 MILLIGRAM(S): at 05:26

## 2024-05-05 RX ADMIN — ALBUTEROL 2 PUFF(S): 90 AEROSOL, METERED ORAL at 18:54

## 2024-05-05 RX ADMIN — CEFTRIAXONE 100 MILLIGRAM(S): 500 INJECTION, POWDER, FOR SOLUTION INTRAMUSCULAR; INTRAVENOUS at 05:26

## 2024-05-05 RX ADMIN — APIXABAN 5 MILLIGRAM(S): 2.5 TABLET, FILM COATED ORAL at 05:26

## 2024-05-05 RX ADMIN — METHOTREXATE 15 MILLIGRAM(S): 2.5 TABLET ORAL at 08:39

## 2024-05-05 RX ADMIN — AZITHROMYCIN 255 MILLIGRAM(S): 500 TABLET, FILM COATED ORAL at 05:26

## 2024-05-05 RX ADMIN — LOSARTAN POTASSIUM 100 MILLIGRAM(S): 100 TABLET, FILM COATED ORAL at 05:26

## 2024-05-05 RX ADMIN — APIXABAN 5 MILLIGRAM(S): 2.5 TABLET, FILM COATED ORAL at 18:02

## 2024-05-05 RX ADMIN — Medication 40 MILLIGRAM(S): at 16:10

## 2024-05-05 NOTE — PROGRESS NOTE ADULT - SUBJECTIVE AND OBJECTIVE BOX
Patient is a 83y old  Female who presents with a chief complaint of PNA, hypoxia (04 May 2024 06:37)    INTERVAL HPI/OVERNIGHT EVENTS: Patients seen and examined at bedside this morning. No acute events overnight. Pt reports    MEDICATIONS  (STANDING):  apixaban 5 milliGRAM(s) Oral every 12 hours  atorvastatin 10 milliGRAM(s) Oral at bedtime  azithromycin  IVPB 500 milliGRAM(s) IV Intermittent every 24 hours  cefTRIAXone   IVPB 1000 milliGRAM(s) IV Intermittent every 24 hours  gabapentin 300 milliGRAM(s) Oral two times a day  levothyroxine 50 MICROGram(s) Oral daily  losartan 100 milliGRAM(s) Oral daily  methotrexate 15 milliGRAM(s) Oral <User Schedule>  metoprolol tartrate 25 milliGRAM(s) Oral two times a day    MEDICATIONS  (PRN):  acetaminophen     Tablet .. 650 milliGRAM(s) Oral every 6 hours PRN Temp greater or equal to 38C (100.4F), Mild Pain (1 - 3)  aluminum hydroxide/magnesium hydroxide/simethicone Suspension 30 milliLiter(s) Oral every 4 hours PRN Dyspepsia  guaifenesin/dextromethorphan Oral Liquid 10 milliLiter(s) Oral every 4 hours PRN Cough  melatonin 3 milliGRAM(s) Oral at bedtime PRN Insomnia  ondansetron Injectable 4 milliGRAM(s) IV Push every 8 hours PRN Nausea and/or Vomiting    Allergies    No Known Allergies    Intolerances      REVIEW OF SYSTEMS:  All other systems reviewed and are negative    Vital Signs Last 24 Hrs  T(C): 36.5 (05 May 2024 04:59), Max: 37.4 (04 May 2024 17:00)  T(F): 97.7 (05 May 2024 04:59), Max: 99.4 (04 May 2024 17:00)  HR: 53 (05 May 2024 06:00) (53 - 93)  BP: 148/68 (05 May 2024 04:59) (145/80 - 183/95)  BP(mean): --  RR: 20 (05 May 2024 06:00) (16 - 24)  SpO2: 96% (05 May 2024 06:00) (94% - 98%)    Parameters below as of 05 May 2024 06:00  Patient On (Oxygen Delivery Method): nasal cannula, high flow  O2 Flow (L/min): 30  O2 Concentration (%): 32  Daily     Daily Weight in k.3 (05 May 2024 06:00)  I&O's Summary    04 May 2024 07:01  -  05 May 2024 07:00  --------------------------------------------------------  IN: 300 mL / OUT: 452 mL / NET: -152 mL      CAPILLARY BLOOD GLUCOSE        PHYSICAL EXAM:  GENERAL: NAD, high flow NC in place.   HEAD:  Atraumatic, Normocephalic  EYES: EOMI, PERRLA, conjunctiva and sclera clear  ENT: Moist mucous membranes  NECK: Supple, No JVD  CHEST/LUNG: + rales, rhonchi, - wheezing, or rubs.+ tachypnic   HEART: Regular rate and rhythm; No murmurs, rubs, or gallops  ABDOMEN: BSx4; Soft, nontender, nondistended  EXTREMITIES:  2+ Peripheral Pulses, brisk capillary refill. No clubbing, cyanosis, or edema  NERVOUS SYSTEM:  A&Ox3, no focal deficits   SKIN: No rashes or lesions      Labs                          9.7    6.15  )-----------( 175      ( 05 May 2024 06:23 )             29.3     05-05    136  |  102  |  12  ----------------------------<  106<H>  3.4<L>   |  27  |  0.85    Ca    8.1<L>      05 May 2024 06:23  Phos  2.7     05-  Mg     2.3     05-05    TPro  5.8<L>  /  Alb  2.5<L>  /  TBili  0.7  /  DBili  x   /  AST  38<H>  /  ALT  27  /  AlkPhos  83  05-05          Urinalysis Basic - ( 05 May 2024 06:23 )    Color: x / Appearance: x / SG: x / pH: x  Gluc: 106 mg/dL / Ketone: x  / Bili: x / Urobili: x   Blood: x / Protein: x / Nitrite: x   Leuk Esterase: x / RBC: x / WBC x   Sq Epi: x / Non Sq Epi: x / Bacteria: x                      Radiology and Imaging reviewed. Patient is a 83y old  Female who presents with a chief complaint of PNA, hypoxia (04 May 2024 06:37)    INTERVAL HPI/OVERNIGHT EVENTS: Patients seen and examined at bedside this morning. No acute events overnight. Pt reports feeling better. Currently on 4L NC at bedside.    MEDICATIONS  (STANDING):  apixaban 5 milliGRAM(s) Oral every 12 hours  atorvastatin 10 milliGRAM(s) Oral at bedtime  azithromycin  IVPB 500 milliGRAM(s) IV Intermittent every 24 hours  cefTRIAXone   IVPB 1000 milliGRAM(s) IV Intermittent every 24 hours  gabapentin 300 milliGRAM(s) Oral two times a day  levothyroxine 50 MICROGram(s) Oral daily  losartan 100 milliGRAM(s) Oral daily  methotrexate 15 milliGRAM(s) Oral <User Schedule>  metoprolol tartrate 25 milliGRAM(s) Oral two times a day    MEDICATIONS  (PRN):  acetaminophen     Tablet .. 650 milliGRAM(s) Oral every 6 hours PRN Temp greater or equal to 38C (100.4F), Mild Pain (1 - 3)  aluminum hydroxide/magnesium hydroxide/simethicone Suspension 30 milliLiter(s) Oral every 4 hours PRN Dyspepsia  guaifenesin/dextromethorphan Oral Liquid 10 milliLiter(s) Oral every 4 hours PRN Cough  melatonin 3 milliGRAM(s) Oral at bedtime PRN Insomnia  ondansetron Injectable 4 milliGRAM(s) IV Push every 8 hours PRN Nausea and/or Vomiting    Allergies    No Known Allergies    Intolerances      REVIEW OF SYSTEMS:  All other systems reviewed and are negative    Vital Signs Last 24 Hrs  T(C): 36.5 (05 May 2024 04:59), Max: 37.4 (04 May 2024 17:00)  T(F): 97.7 (05 May 2024 04:59), Max: 99.4 (04 May 2024 17:00)  HR: 53 (05 May 2024 06:00) (53 - 93)  BP: 148/68 (05 May 2024 04:59) (145/80 - 183/95)  BP(mean): --  RR: 20 (05 May 2024 06:00) (16 - 24)  SpO2: 96% (05 May 2024 06:00) (94% - 98%)    Parameters below as of 05 May 2024 06:00  Patient On (Oxygen Delivery Method): nasal cannula, high flow  O2 Flow (L/min): 30  O2 Concentration (%): 32  Daily     Daily Weight in k.3 (05 May 2024 06:00)  I&O's Summary    04 May 2024 07:01  -  05 May 2024 07:00  --------------------------------------------------------  IN: 300 mL / OUT: 452 mL / NET: -152 mL      CAPILLARY BLOOD GLUCOSE        PHYSICAL EXAM:  GENERAL: NAD, high flow NC in place.   HEAD:  Atraumatic, Normocephalic  EYES: EOMI, PERRLA, conjunctiva and sclera clear  ENT: Moist mucous membranes  NECK: Supple, No JVD  CHEST/LUNG: + rales, rhonchi, - wheezing, or rubs.+ tachypnic   HEART: Regular rate and rhythm; No murmurs, rubs, or gallops  ABDOMEN: BSx4; Soft, nontender, nondistended  EXTREMITIES:  2+ Peripheral Pulses, brisk capillary refill. No clubbing, cyanosis, or edema  NERVOUS SYSTEM:  A&Ox3, no focal deficits   SKIN: No rashes or lesions      Labs                          9.7    6.15  )-----------( 175      ( 05 May 2024 06:23 )             29.3     05-05    136  |  102  |  12  ----------------------------<  106<H>  3.4<L>   |  27  |  0.85    Ca    8.1<L>      05 May 2024 06:23  Phos  2.7     05-  Mg     2.3     05-05    TPro  5.8<L>  /  Alb  2.5<L>  /  TBili  0.7  /  DBili  x   /  AST  38<H>  /  ALT  27  /  AlkPhos  83  05-05          Urinalysis Basic - ( 05 May 2024 06:23 )    Color: x / Appearance: x / SG: x / pH: x  Gluc: 106 mg/dL / Ketone: x  / Bili: x / Urobili: x   Blood: x / Protein: x / Nitrite: x   Leuk Esterase: x / RBC: x / WBC x   Sq Epi: x / Non Sq Epi: x / Bacteria: x                      Radiology and Imaging reviewed.

## 2024-05-05 NOTE — PROGRESS NOTE ADULT - ASSESSMENT
83 year old female with Rheumatoid arthritis, HTN, A fib on Eliquis,  RLE stent(for PAD) , Right  Breast CA S/P Lumpectomy +Leidy dissection followed by Chem and radiation here for complaint of cough with congestion x 1 week. Per pt she has been having severe sob with with coughing and breathing fast and heavy. In ED pt was placed on HFNC for tachypnia and  increased work of breathing.  Admit for pna.         Problem/Plan - 1:  ·  Problem: PNA (pneumonia).   ·  Plan: ceftriaxone + azithromycin  legionella + strep pnumo  Sputum culture  HFNC for now downtitrate based on pts comfort.    Problem/Plan - 2:  ·  Problem: A-fib.   ·  Plan: metoprolol  eliquis.    Problem/Plan - 3:  ·  Problem: HTN (hypertension).   ·  Plan: losartan.    Problem/Plan - 4:  ·  Problem: Rheumatoid arthritis.   ·  Plan: methotrexate 15mg every sunday 83 year old female with Rheumatoid arthritis, HTN, A fib on Eliquis,  RLE stent(for PAD) , Right  Breast CA S/P Lumpectomy +Leidy dissection followed by Chem and radiation here for complaint of cough with congestion x 1 week. Per pt she has been having severe sob with with coughing and breathing fast and heavy. In ED pt was placed on HFNC for tachypnia and  increased work of breathing.  Admit for pna.         Problem/Plan - 1:  ·  Problem: PNA (pneumonia).   ·  Plan: ceftriaxone + azithromycin  legionella + strep pnumo  Sputum culture  HFNC for now downtitrate based on pts comfort.  (5/5) Clinically improving. now on 4L NC. neb treatments added. Add short course of prednisone.     Problem/Plan - 2:  ·  Problem: A-fib.   ·  Plan: metoprolol  eliquis.    Problem/Plan - 3:  ·  Problem: HTN (hypertension).   ·  Plan: losartan.    Problem/Plan - 4:  ·  Problem: Rheumatoid arthritis.   ·  Plan: methotrexate 15mg every sunday

## 2024-05-06 LAB
ANION GAP SERPL CALC-SCNC: 8 MMOL/L — SIGNIFICANT CHANGE UP (ref 5–17)
BUN SERPL-MCNC: 10 MG/DL — SIGNIFICANT CHANGE UP (ref 7–23)
CALCIUM SERPL-MCNC: 8.5 MG/DL — SIGNIFICANT CHANGE UP (ref 8.5–10.1)
CHLORIDE SERPL-SCNC: 104 MMOL/L — SIGNIFICANT CHANGE UP (ref 96–108)
CO2 SERPL-SCNC: 25 MMOL/L — SIGNIFICANT CHANGE UP (ref 22–31)
CREAT SERPL-MCNC: 0.72 MG/DL — SIGNIFICANT CHANGE UP (ref 0.5–1.3)
EGFR: 82 ML/MIN/1.73M2 — SIGNIFICANT CHANGE UP
GLUCOSE SERPL-MCNC: 159 MG/DL — HIGH (ref 70–99)
HCT VFR BLD CALC: 29 % — LOW (ref 34.5–45)
HGB BLD-MCNC: 9.5 G/DL — LOW (ref 11.5–15.5)
MAGNESIUM SERPL-MCNC: 2.3 MG/DL — SIGNIFICANT CHANGE UP (ref 1.6–2.6)
MCHC RBC-ENTMCNC: 31.3 PG — SIGNIFICANT CHANGE UP (ref 27–34)
MCHC RBC-ENTMCNC: 32.8 G/DL — SIGNIFICANT CHANGE UP (ref 32–36)
MCV RBC AUTO: 95.4 FL — SIGNIFICANT CHANGE UP (ref 80–100)
NRBC # BLD: 0 /100 WBCS — SIGNIFICANT CHANGE UP (ref 0–0)
PLATELET # BLD AUTO: 210 K/UL — SIGNIFICANT CHANGE UP (ref 150–400)
POTASSIUM SERPL-MCNC: 4.2 MMOL/L — SIGNIFICANT CHANGE UP (ref 3.5–5.3)
POTASSIUM SERPL-SCNC: 4.2 MMOL/L — SIGNIFICANT CHANGE UP (ref 3.5–5.3)
RBC # BLD: 3.04 M/UL — LOW (ref 3.8–5.2)
RBC # FLD: 17 % — HIGH (ref 10.3–14.5)
SODIUM SERPL-SCNC: 137 MMOL/L — SIGNIFICANT CHANGE UP (ref 135–145)
WBC # BLD: 6.74 K/UL — SIGNIFICANT CHANGE UP (ref 3.8–10.5)
WBC # FLD AUTO: 6.74 K/UL — SIGNIFICANT CHANGE UP (ref 3.8–10.5)

## 2024-05-06 PROCEDURE — 99233 SBSQ HOSP IP/OBS HIGH 50: CPT

## 2024-05-06 RX ADMIN — GABAPENTIN 300 MILLIGRAM(S): 400 CAPSULE ORAL at 05:07

## 2024-05-06 RX ADMIN — ALBUTEROL 2 PUFF(S): 90 AEROSOL, METERED ORAL at 12:51

## 2024-05-06 RX ADMIN — CEFTRIAXONE 100 MILLIGRAM(S): 500 INJECTION, POWDER, FOR SOLUTION INTRAMUSCULAR; INTRAVENOUS at 04:30

## 2024-05-06 RX ADMIN — Medication 40 MILLIGRAM(S): at 05:14

## 2024-05-06 RX ADMIN — LOSARTAN POTASSIUM 100 MILLIGRAM(S): 100 TABLET, FILM COATED ORAL at 05:38

## 2024-05-06 RX ADMIN — AZITHROMYCIN 255 MILLIGRAM(S): 500 TABLET, FILM COATED ORAL at 05:07

## 2024-05-06 RX ADMIN — APIXABAN 5 MILLIGRAM(S): 2.5 TABLET, FILM COATED ORAL at 18:14

## 2024-05-06 RX ADMIN — Medication 50 MICROGRAM(S): at 05:38

## 2024-05-06 RX ADMIN — Medication 25 MILLIGRAM(S): at 05:38

## 2024-05-06 RX ADMIN — GABAPENTIN 300 MILLIGRAM(S): 400 CAPSULE ORAL at 18:18

## 2024-05-06 RX ADMIN — ALBUTEROL 2 PUFF(S): 90 AEROSOL, METERED ORAL at 18:14

## 2024-05-06 RX ADMIN — Medication 25 MILLIGRAM(S): at 18:14

## 2024-05-06 RX ADMIN — ATORVASTATIN CALCIUM 10 MILLIGRAM(S): 80 TABLET, FILM COATED ORAL at 23:09

## 2024-05-06 RX ADMIN — BUDESONIDE AND FORMOTEROL FUMARATE DIHYDRATE 2 PUFF(S): 160; 4.5 AEROSOL RESPIRATORY (INHALATION) at 06:30

## 2024-05-06 RX ADMIN — ALBUTEROL 2 PUFF(S): 90 AEROSOL, METERED ORAL at 00:15

## 2024-05-06 RX ADMIN — ALBUTEROL 2 PUFF(S): 90 AEROSOL, METERED ORAL at 06:14

## 2024-05-06 RX ADMIN — ALBUTEROL 2 PUFF(S): 90 AEROSOL, METERED ORAL at 23:10

## 2024-05-06 RX ADMIN — APIXABAN 5 MILLIGRAM(S): 2.5 TABLET, FILM COATED ORAL at 05:13

## 2024-05-06 RX ADMIN — BUDESONIDE AND FORMOTEROL FUMARATE DIHYDRATE 2 PUFF(S): 160; 4.5 AEROSOL RESPIRATORY (INHALATION) at 18:14

## 2024-05-06 NOTE — PROGRESS NOTE ADULT - ASSESSMENT
83 year old female with Rheumatoid arthritis, HTN, A fib on Eliquis,  RLE stent(for PAD) , Right  Breast CA S/P Lumpectomy +Leidy dissection followed by Chem and radiation here for complaint of cough with congestion x 1 week. Per pt she has been having severe sob with with coughing and breathing fast and heavy. In ED pt was placed on HFNC for tachypnia and  increased work of breathing.  Admit for pna.     Problem/Plan - 1:  ·  Problem: PNA (pneumonia).   ·  Plan: ceftriaxone + azithromycin  legionella + strep pnumo  Sputum culture  HFNC for now downtitrate based on pts comfort.  (5/5) Clinically improving. now on 4L NC. neb treatments added. Add short course of prednisone.   (5/6) Legnionella neg. D/C Azithro. Titrate NC. PT consult    Problem/Plan - 2:  ·  Problem: A-fib.   ·  Plan: metoprolol  eliquis.    Problem/Plan - 3:  ·  Problem: HTN (hypertension).   ·  Plan: losartan.    Problem/Plan - 4:  ·  Problem: Rheumatoid arthritis.   ·  Plan: methotrexate 15mg every sunday

## 2024-05-06 NOTE — CHART NOTE - NSCHARTNOTEFT_GEN_A_CORE
Patient seen for MST score 2 or > nutrition risk rescreening. Patient has been screened for and presents negative for    -Pressure ulcers stage 2 or greater  -Enteral or Parenteral Nutrition  -BMI <18  -IrchcqeftdV8K >8  -Chewing/Swallowing Difficulty  -Decreased appetite  -Unintentional Weight Loss  -Inadequate diet (i.e. NPO/Clear Liquids)    No intervention required at this time. RD remains available. Pt is Consult received for "MST Score = />2." Upon chart review, patient with stable weight, does not currently meet criteria for Protein Calorie Malnutrition risk per MST. RD remains available for assessment per protocol or as needed. Seen on medical floor, adm w/ PNA , hypoxia ; rheumatoid arthritis , HTN , A- Fib on Eliquis, RLE stent for PAD ; R Breast Ca, s/p lumpectomy + yuli dissection followed by chemo & RT w/ c/o cough & congestion x 1 week PTA. Difficulty hearing. Denies N/V/D/C/Chewing/Swallowing issues, No food allergies. Tolerating Regular diet with improving appetite daily. Denies N/V/D/C/Chewing/Swallowing issues, No food allergies wt loss. Appears WDWN. No c/o at this time.

## 2024-05-06 NOTE — PROGRESS NOTE ADULT - SUBJECTIVE AND OBJECTIVE BOX
Patient is a 83y old  Female who presents with a chief complaint of PNA, hypoxia (05 May 2024 08:52)    INTERVAL HPI/OVERNIGHT EVENTS: Patients seen and examined at bedside this morning. No acute events overnight. Pt reports    MEDICATIONS  (STANDING):  albuterol    90 MICROgram(s) HFA Inhaler 2 Puff(s) Inhalation every 6 hours  apixaban 5 milliGRAM(s) Oral every 12 hours  atorvastatin 10 milliGRAM(s) Oral at bedtime  budesonide  80 MICROgram(s)/formoterol 4.5 MICROgram(s) Inhaler 2 Puff(s) Inhalation two times a day  cefTRIAXone   IVPB 1000 milliGRAM(s) IV Intermittent every 24 hours  gabapentin 300 milliGRAM(s) Oral two times a day  levothyroxine 50 MICROGram(s) Oral daily  losartan 100 milliGRAM(s) Oral daily  methotrexate 15 milliGRAM(s) Oral <User Schedule>  metoprolol tartrate 25 milliGRAM(s) Oral two times a day  predniSONE   Tablet 40 milliGRAM(s) Oral daily    MEDICATIONS  (PRN):  acetaminophen     Tablet .. 650 milliGRAM(s) Oral every 6 hours PRN Temp greater or equal to 38C (100.4F), Mild Pain (1 - 3)  aluminum hydroxide/magnesium hydroxide/simethicone Suspension 30 milliLiter(s) Oral every 4 hours PRN Dyspepsia  guaifenesin/dextromethorphan Oral Liquid 10 milliLiter(s) Oral every 4 hours PRN Cough  melatonin 3 milliGRAM(s) Oral at bedtime PRN Insomnia  ondansetron Injectable 4 milliGRAM(s) IV Push every 8 hours PRN Nausea and/or Vomiting    Allergies    No Known Allergies    Intolerances      REVIEW OF SYSTEMS:  All other systems reviewed and are negative    Vital Signs Last 24 Hrs  T(C): 36.6 (06 May 2024 11:32), Max: 36.8 (05 May 2024 18:06)  T(F): 97.9 (06 May 2024 11:32), Max: 98.2 (05 May 2024 18:06)  HR: 65 (06 May 2024 11:32) (57 - 73)  BP: 159/84 (06 May 2024 11:32) (157/84 - 178/95)  BP(mean): --  RR: 20 (06 May 2024 11:32) (18 - 20)  SpO2: 96% (06 May 2024 11:32) (96% - 99%)    Parameters below as of 06 May 2024 11:32  Patient On (Oxygen Delivery Method): nasal cannula  O2 Flow (L/min): 3    Daily     Daily Weight in k.8 (06 May 2024 05:24)  I&O's Summary    05 May 2024 07:01  -  06 May 2024 07:00  --------------------------------------------------------  IN: 0 mL / OUT: 600 mL / NET: -600 mL    06 May 2024 07:01  -  06 May 2024 15:12  --------------------------------------------------------  IN: 720 mL / OUT: 400 mL / NET: 320 mL      CAPILLARY BLOOD GLUCOSE        PHYSICAL EXAM:  GENERAL: NAD, high flow NC in place.   HEAD:  Atraumatic, Normocephalic  EYES: EOMI, PERRLA, conjunctiva and sclera clear  ENT: Moist mucous membranes  NECK: Supple, No JVD  CHEST/LUNG: + rales, rhonchi, - wheezing, or rubs.+ tachypnic   HEART: Regular rate and rhythm; No murmurs, rubs, or gallops  ABDOMEN: BSx4; Soft, nontender, nondistended  EXTREMITIES:  2+ Peripheral Pulses, brisk capillary refill. No clubbing, cyanosis, or edema  NERVOUS SYSTEM:  A&Ox3, no focal deficits   SKIN: No rashes or lesions    Labs                          9.5    6.74  )-----------( 210      ( 06 May 2024 07:40 )             29.0     05-    137  |  104  |  10  ----------------------------<  159<H>  4.2   |  25  |  0.72    Ca    8.5      06 May 2024 07:40  Phos  2.7     05-05  Mg     2.3     05-06    TPro  5.8<L>  /  Alb  2.5<L>  /  TBili  0.7  /  DBili  x   /  AST  38<H>  /  ALT  27  /  AlkPhos  83  -          Urinalysis Basic - ( 06 May 2024 07:40 )    Color: x / Appearance: x / SG: x / pH: x  Gluc: 159 mg/dL / Ketone: x  / Bili: x / Urobili: x   Blood: x / Protein: x / Nitrite: x   Leuk Esterase: x / RBC: x / WBC x   Sq Epi: x / Non Sq Epi: x / Bacteria: x                      Radiology and Imaging reviewed. Patient is a 83y old  Female who presents with a chief complaint of PNA, hypoxia (05 May 2024 08:52)    INTERVAL HPI/OVERNIGHT EVENTS: Patients seen and examined at bedside this morning. No acute events overnight. Pt reports SOB improving. on 2L at bedside. Non-productive cough.    MEDICATIONS  (STANDING):  albuterol    90 MICROgram(s) HFA Inhaler 2 Puff(s) Inhalation every 6 hours  apixaban 5 milliGRAM(s) Oral every 12 hours  atorvastatin 10 milliGRAM(s) Oral at bedtime  budesonide  80 MICROgram(s)/formoterol 4.5 MICROgram(s) Inhaler 2 Puff(s) Inhalation two times a day  cefTRIAXone   IVPB 1000 milliGRAM(s) IV Intermittent every 24 hours  gabapentin 300 milliGRAM(s) Oral two times a day  levothyroxine 50 MICROGram(s) Oral daily  losartan 100 milliGRAM(s) Oral daily  methotrexate 15 milliGRAM(s) Oral <User Schedule>  metoprolol tartrate 25 milliGRAM(s) Oral two times a day  predniSONE   Tablet 40 milliGRAM(s) Oral daily    MEDICATIONS  (PRN):  acetaminophen     Tablet .. 650 milliGRAM(s) Oral every 6 hours PRN Temp greater or equal to 38C (100.4F), Mild Pain (1 - 3)  aluminum hydroxide/magnesium hydroxide/simethicone Suspension 30 milliLiter(s) Oral every 4 hours PRN Dyspepsia  guaifenesin/dextromethorphan Oral Liquid 10 milliLiter(s) Oral every 4 hours PRN Cough  melatonin 3 milliGRAM(s) Oral at bedtime PRN Insomnia  ondansetron Injectable 4 milliGRAM(s) IV Push every 8 hours PRN Nausea and/or Vomiting    Allergies    No Known Allergies    Intolerances      REVIEW OF SYSTEMS:  All other systems reviewed and are negative    Vital Signs Last 24 Hrs  T(C): 36.6 (06 May 2024 11:32), Max: 36.8 (05 May 2024 18:06)  T(F): 97.9 (06 May 2024 11:32), Max: 98.2 (05 May 2024 18:06)  HR: 65 (06 May 2024 11:32) (57 - 73)  BP: 159/84 (06 May 2024 11:32) (157/84 - 178/95)  BP(mean): --  RR: 20 (06 May 2024 11:32) (18 - 20)  SpO2: 96% (06 May 2024 11:32) (96% - 99%)    Parameters below as of 06 May 2024 11:32  Patient On (Oxygen Delivery Method): nasal cannula  O2 Flow (L/min): 3    Daily     Daily Weight in k.8 (06 May 2024 05:24)  I&O's Summary    05 May 2024 07:01  -  06 May 2024 07:00  --------------------------------------------------------  IN: 0 mL / OUT: 600 mL / NET: -600 mL    06 May 2024 07:01  -  06 May 2024 15:12  --------------------------------------------------------  IN: 720 mL / OUT: 400 mL / NET: 320 mL      CAPILLARY BLOOD GLUCOSE        PHYSICAL EXAM:  GENERAL: NAD, high flow NC in place.   HEAD:  Atraumatic, Normocephalic  EYES: EOMI, PERRLA, conjunctiva and sclera clear  ENT: Moist mucous membranes  NECK: Supple, No JVD  CHEST/LUNG: + rales, rhonchi, - wheezing, or rubs.+ tachypnic   HEART: Regular rate and rhythm; No murmurs, rubs, or gallops  ABDOMEN: BSx4; Soft, nontender, nondistended  EXTREMITIES:  2+ Peripheral Pulses, brisk capillary refill. No clubbing, cyanosis, or edema  NERVOUS SYSTEM:  A&Ox3, no focal deficits   SKIN: No rashes or lesions    Labs                          9.5    6.74  )-----------( 210      ( 06 May 2024 07:40 )             29.0         137  |  104  |  10  ----------------------------<  159<H>  4.2   |  25  |  0.72    Ca    8.5      06 May 2024 07:40  Phos  2.7     05-05  Mg     2.3     05-06    TPro  5.8<L>  /  Alb  2.5<L>  /  TBili  0.7  /  DBili  x   /  AST  38<H>  /  ALT  27  /  AlkPhos  83  05-05          Urinalysis Basic - ( 06 May 2024 07:40 )    Color: x / Appearance: x / SG: x / pH: x  Gluc: 159 mg/dL / Ketone: x  / Bili: x / Urobili: x   Blood: x / Protein: x / Nitrite: x   Leuk Esterase: x / RBC: x / WBC x   Sq Epi: x / Non Sq Epi: x / Bacteria: x                      Radiology and Imaging reviewed.

## 2024-05-07 ENCOUNTER — TRANSCRIPTION ENCOUNTER (OUTPATIENT)
Age: 84
End: 2024-05-07

## 2024-05-07 LAB
ANION GAP SERPL CALC-SCNC: 6 MMOL/L — SIGNIFICANT CHANGE UP (ref 5–17)
BUN SERPL-MCNC: 18 MG/DL — SIGNIFICANT CHANGE UP (ref 7–23)
CALCIUM SERPL-MCNC: 8.6 MG/DL — SIGNIFICANT CHANGE UP (ref 8.5–10.1)
CHLORIDE SERPL-SCNC: 106 MMOL/L — SIGNIFICANT CHANGE UP (ref 96–108)
CO2 SERPL-SCNC: 27 MMOL/L — SIGNIFICANT CHANGE UP (ref 22–31)
CREAT SERPL-MCNC: 0.86 MG/DL — SIGNIFICANT CHANGE UP (ref 0.5–1.3)
EGFR: 67 ML/MIN/1.73M2 — SIGNIFICANT CHANGE UP
GLUCOSE SERPL-MCNC: 107 MG/DL — HIGH (ref 70–99)
HCT VFR BLD CALC: 28.7 % — LOW (ref 34.5–45)
HGB BLD-MCNC: 9.6 G/DL — LOW (ref 11.5–15.5)
MCHC RBC-ENTMCNC: 32.2 PG — SIGNIFICANT CHANGE UP (ref 27–34)
MCHC RBC-ENTMCNC: 33.4 G/DL — SIGNIFICANT CHANGE UP (ref 32–36)
MCV RBC AUTO: 96.3 FL — SIGNIFICANT CHANGE UP (ref 80–100)
NRBC # BLD: 0 /100 WBCS — SIGNIFICANT CHANGE UP (ref 0–0)
PLATELET # BLD AUTO: 263 K/UL — SIGNIFICANT CHANGE UP (ref 150–400)
POTASSIUM SERPL-MCNC: 4 MMOL/L — SIGNIFICANT CHANGE UP (ref 3.5–5.3)
POTASSIUM SERPL-SCNC: 4 MMOL/L — SIGNIFICANT CHANGE UP (ref 3.5–5.3)
RBC # BLD: 2.98 M/UL — LOW (ref 3.8–5.2)
RBC # FLD: 17.1 % — HIGH (ref 10.3–14.5)
SODIUM SERPL-SCNC: 139 MMOL/L — SIGNIFICANT CHANGE UP (ref 135–145)
WBC # BLD: 10.17 K/UL — SIGNIFICANT CHANGE UP (ref 3.8–10.5)
WBC # FLD AUTO: 10.17 K/UL — SIGNIFICANT CHANGE UP (ref 3.8–10.5)

## 2024-05-07 PROCEDURE — 99233 SBSQ HOSP IP/OBS HIGH 50: CPT

## 2024-05-07 RX ADMIN — ALBUTEROL 2 PUFF(S): 90 AEROSOL, METERED ORAL at 11:44

## 2024-05-07 RX ADMIN — LOSARTAN POTASSIUM 100 MILLIGRAM(S): 100 TABLET, FILM COATED ORAL at 05:50

## 2024-05-07 RX ADMIN — ALBUTEROL 2 PUFF(S): 90 AEROSOL, METERED ORAL at 18:00

## 2024-05-07 RX ADMIN — GABAPENTIN 300 MILLIGRAM(S): 400 CAPSULE ORAL at 05:50

## 2024-05-07 RX ADMIN — APIXABAN 5 MILLIGRAM(S): 2.5 TABLET, FILM COATED ORAL at 18:01

## 2024-05-07 RX ADMIN — BUDESONIDE AND FORMOTEROL FUMARATE DIHYDRATE 2 PUFF(S): 160; 4.5 AEROSOL RESPIRATORY (INHALATION) at 18:00

## 2024-05-07 RX ADMIN — BUDESONIDE AND FORMOTEROL FUMARATE DIHYDRATE 2 PUFF(S): 160; 4.5 AEROSOL RESPIRATORY (INHALATION) at 05:51

## 2024-05-07 RX ADMIN — CEFTRIAXONE 100 MILLIGRAM(S): 500 INJECTION, POWDER, FOR SOLUTION INTRAMUSCULAR; INTRAVENOUS at 05:49

## 2024-05-07 RX ADMIN — ALBUTEROL 2 PUFF(S): 90 AEROSOL, METERED ORAL at 05:51

## 2024-05-07 RX ADMIN — ATORVASTATIN CALCIUM 10 MILLIGRAM(S): 80 TABLET, FILM COATED ORAL at 21:48

## 2024-05-07 RX ADMIN — GABAPENTIN 300 MILLIGRAM(S): 400 CAPSULE ORAL at 18:02

## 2024-05-07 RX ADMIN — Medication 50 MICROGRAM(S): at 05:50

## 2024-05-07 RX ADMIN — Medication 25 MILLIGRAM(S): at 18:00

## 2024-05-07 RX ADMIN — Medication 40 MILLIGRAM(S): at 05:50

## 2024-05-07 RX ADMIN — APIXABAN 5 MILLIGRAM(S): 2.5 TABLET, FILM COATED ORAL at 05:50

## 2024-05-07 RX ADMIN — Medication 25 MILLIGRAM(S): at 05:51

## 2024-05-07 NOTE — PHYSICAL THERAPY INITIAL EVALUATION ADULT - ADDITIONAL COMMENTS
Pt lives alone in a house with 3 steps B close HR to enter. Pt resides on main level of the house with all amenities. Currently, pt has her 2 sisters from [place] staying with her and will be able to assist pt as needed. Also, pt daughters are involved and able to assist pt after discharge. Pt was indep with ambulation and ADLS without AD. Pt uses a straight cane for community ambulation. Pt currently drives. Pt states she owns a RW in good working condition and easily accessible.

## 2024-05-07 NOTE — PROGRESS NOTE ADULT - SUBJECTIVE AND OBJECTIVE BOX
Patient is a 83y old  Female who presents with a chief complaint of PNA, hypoxia (06 May 2024 15:12)    INTERVAL HPI/OVERNIGHT EVENTS: Patients seen and examined at bedside this morning. No acute events overnight. Pt reports SOB improving. Still feeling fatigue and has productive sputum now.     MEDICATIONS  (STANDING):  albuterol    90 MICROgram(s) HFA Inhaler 2 Puff(s) Inhalation every 6 hours  apixaban 5 milliGRAM(s) Oral every 12 hours  atorvastatin 10 milliGRAM(s) Oral at bedtime  budesonide  80 MICROgram(s)/formoterol 4.5 MICROgram(s) Inhaler 2 Puff(s) Inhalation two times a day  cefTRIAXone   IVPB 1000 milliGRAM(s) IV Intermittent every 24 hours  gabapentin 300 milliGRAM(s) Oral two times a day  levothyroxine 50 MICROGram(s) Oral daily  losartan 100 milliGRAM(s) Oral daily  methotrexate 15 milliGRAM(s) Oral <User Schedule>  metoprolol tartrate 25 milliGRAM(s) Oral two times a day  predniSONE   Tablet 40 milliGRAM(s) Oral daily    MEDICATIONS  (PRN):  acetaminophen     Tablet .. 650 milliGRAM(s) Oral every 6 hours PRN Temp greater or equal to 38C (100.4F), Mild Pain (1 - 3)  aluminum hydroxide/magnesium hydroxide/simethicone Suspension 30 milliLiter(s) Oral every 4 hours PRN Dyspepsia  guaifenesin/dextromethorphan Oral Liquid 10 milliLiter(s) Oral every 4 hours PRN Cough  melatonin 3 milliGRAM(s) Oral at bedtime PRN Insomnia  ondansetron Injectable 4 milliGRAM(s) IV Push every 8 hours PRN Nausea and/or Vomiting    Allergies    No Known Allergies    Intolerances      REVIEW OF SYSTEMS:  All other systems reviewed and are negative    Vital Signs Last 24 Hrs  T(C): 36.8 (07 May 2024 11:31), Max: 37 (06 May 2024 16:48)  T(F): 98.2 (07 May 2024 11:31), Max: 98.6 (06 May 2024 16:48)  HR: 61 (07 May 2024 11:31) (58 - 67)  BP: 136/56 (07 May 2024 11:31) (136/56 - 173/84)  BP(mean): --  RR: 17 (07 May 2024 11:31) (17 - 20)  SpO2: 93% (07 May 2024 11:31) (93% - 99%)    Parameters below as of 07 May 2024 11:31  Patient On (Oxygen Delivery Method): room air      Daily     Daily   I&O's Summary    06 May 2024 07:01  -  07 May 2024 07:00  --------------------------------------------------------  IN: 720 mL / OUT: 950 mL / NET: -230 mL      CAPILLARY BLOOD GLUCOSE        PHYSICAL EXAM:  GENERAL: NAD  HEAD:  Atraumatic, Normocephalic  EYES: EOMI, PERRLA, conjunctiva and sclera clear  ENT: Moist mucous membranes  NECK: Supple, No JVD  CHEST/LUNG: + rales, rhonchi, - wheezing, or rubs  HEART: Regular rate and rhythm; No murmurs, rubs, or gallops  ABDOMEN: BSx4; Soft, nontender, nondistended  EXTREMITIES:  2+ Peripheral Pulses, brisk capillary refill. No clubbing, cyanosis, or edema  NERVOUS SYSTEM:  A&Ox3, no focal deficits   SKIN: No rashes or lesions      Labs                          9.6    10.17 )-----------( 263      ( 07 May 2024 08:00 )             28.7     05-07    139  |  106  |  18  ----------------------------<  107<H>  4.0   |  27  |  0.86    Ca    8.6      07 May 2024 08:00  Mg     2.3     05-06            Urinalysis Basic - ( 07 May 2024 08:00 )    Color: x / Appearance: x / SG: x / pH: x  Gluc: 107 mg/dL / Ketone: x  / Bili: x / Urobili: x   Blood: x / Protein: x / Nitrite: x   Leuk Esterase: x / RBC: x / WBC x   Sq Epi: x / Non Sq Epi: x / Bacteria: x                      Radiology and Imaging reviewed.

## 2024-05-07 NOTE — PROGRESS NOTE ADULT - ASSESSMENT
83 year old female with Rheumatoid arthritis, HTN, A fib on Eliquis,  RLE stent(for PAD) , Right  Breast CA S/P Lumpectomy +Leidy dissection followed by Chem and radiation here for complaint of cough with congestion x 1 week. Per pt she has been having severe sob with with coughing and breathing fast and heavy. In ED pt was placed on HFNC for tachypnia and  increased work of breathing.  Admit for pna.     Problem/Plan - 1:  ·  Problem: PNA (pneumonia).   ·  Plan: ceftriaxone + azithromycin  legionella + strep pnumo  Sputum culture  HFNC for now downtitrate based on pts comfort.  (5/5) Clinically improving. now on 4L NC. neb treatments added. Add short course of prednisone.   (5/6) Legnionella neg. D/C Azithro. Titrate NC. PT consult  (5/7) Clinically improving. Discharge vanessa if pt continues to maintain room air on po steroids and po abx.     Problem/Plan - 2:  ·  Problem: A-fib.   ·  Plan: metoprolol  eliquis.    Problem/Plan - 3:  ·  Problem: HTN (hypertension).   ·  Plan: losartan.    Problem/Plan - 4:  ·  Problem: Rheumatoid arthritis.   ·  Plan: methotrexate 15mg every sunday

## 2024-05-07 NOTE — DISCHARGE NOTE NURSING/CASE MANAGEMENT/SOCIAL WORK - PATIENT PORTAL LINK FT
You can access the FollowMyHealth Patient Portal offered by [facility] by registering at the following website: [email or web address]. By joining [employer]’s FollowMyHealth portal, you will also be able to view your health information using other applications (apps) compatible with our system.

## 2024-05-07 NOTE — PHYSICAL THERAPY INITIAL EVALUATION ADULT - RANGE OF MOTION EXAMINATION, REHAB EVAL
except R ankle in neutral positioning due to sx fixation per pt./bilateral upper extremity ROM was WFL (within functional limits)/bilateral lower extremity ROM was WFL (within functional limits)

## 2024-05-07 NOTE — PHYSICAL THERAPY INITIAL EVALUATION ADULT - GENERAL OBSERVATIONS, REHAB EVAL
Pt found semisupine in bed A&Ox4, cooperative. Denies SOB/dizziness/pain. Vitals monitored throughout session. SaO2 between 93-95% RA. Pt found semisupine in bed A&Ox4, cooperative. +Contact Precautions. Denies SOB/dizziness/pain. Vitals monitored throughout session. SaO2 between 93-95% RA.

## 2024-05-08 ENCOUNTER — TRANSCRIPTION ENCOUNTER (OUTPATIENT)
Age: 84
End: 2024-05-08

## 2024-05-08 VITALS
TEMPERATURE: 98 F | DIASTOLIC BLOOD PRESSURE: 80 MMHG | HEART RATE: 63 BPM | SYSTOLIC BLOOD PRESSURE: 145 MMHG | RESPIRATION RATE: 17 BRPM | OXYGEN SATURATION: 96 %

## 2024-05-08 PROCEDURE — 99239 HOSP IP/OBS DSCHRG MGMT >30: CPT

## 2024-05-08 RX ORDER — ALBUTEROL 90 UG/1
2 AEROSOL, METERED ORAL
Qty: 1 | Refills: 0
Start: 2024-05-08

## 2024-05-08 RX ORDER — CEFDINIR 250 MG/5ML
1 POWDER, FOR SUSPENSION ORAL
Qty: 6 | Refills: 0
Start: 2024-05-08 | End: 2024-05-10

## 2024-05-08 RX ORDER — HYDROCHLOROTHIAZIDE 25 MG
1 TABLET ORAL
Qty: 0 | Refills: 0 | DISCHARGE

## 2024-05-08 RX ORDER — LOSARTAN POTASSIUM 100 MG/1
1 TABLET, FILM COATED ORAL
Qty: 0 | Refills: 1 | DISCHARGE

## 2024-05-08 RX ORDER — GABAPENTIN 400 MG/1
1 CAPSULE ORAL
Qty: 0 | Refills: 3 | DISCHARGE

## 2024-05-08 RX ORDER — METHOTREXATE 2.5 MG/1
6 TABLET ORAL
Qty: 0 | Refills: 0 | DISCHARGE

## 2024-05-08 RX ADMIN — ALBUTEROL 2 PUFF(S): 90 AEROSOL, METERED ORAL at 00:10

## 2024-05-08 RX ADMIN — ALBUTEROL 2 PUFF(S): 90 AEROSOL, METERED ORAL at 05:43

## 2024-05-08 RX ADMIN — ALBUTEROL 2 PUFF(S): 90 AEROSOL, METERED ORAL at 11:31

## 2024-05-08 RX ADMIN — GABAPENTIN 300 MILLIGRAM(S): 400 CAPSULE ORAL at 05:40

## 2024-05-08 RX ADMIN — BUDESONIDE AND FORMOTEROL FUMARATE DIHYDRATE 2 PUFF(S): 160; 4.5 AEROSOL RESPIRATORY (INHALATION) at 05:42

## 2024-05-08 RX ADMIN — CEFTRIAXONE 100 MILLIGRAM(S): 500 INJECTION, POWDER, FOR SOLUTION INTRAMUSCULAR; INTRAVENOUS at 06:16

## 2024-05-08 RX ADMIN — Medication 25 MILLIGRAM(S): at 05:40

## 2024-05-08 RX ADMIN — APIXABAN 5 MILLIGRAM(S): 2.5 TABLET, FILM COATED ORAL at 05:40

## 2024-05-08 RX ADMIN — LOSARTAN POTASSIUM 100 MILLIGRAM(S): 100 TABLET, FILM COATED ORAL at 05:41

## 2024-05-08 RX ADMIN — Medication 50 MICROGRAM(S): at 05:40

## 2024-05-08 RX ADMIN — Medication 40 MILLIGRAM(S): at 05:40

## 2024-05-08 NOTE — DISCHARGE NOTE PROVIDER - HOSPITAL COURSE
83 year old female with Rheumatoid arthritis, HTN, A fib on Eliquis,  RLE stent(for PAD) , Right  Breast CA S/P Lumpectomy +Leidy dissection followed by Chem and radiation here for complaint of cough with congestion x 1 week. Per pt she has been having severe sob with with coughing and breathing fast and heavy. In ED pt was placed on HFNC for tachypnia and  increased work of breathing.  Admit for pna.     Problem/Plan - 1:  ·  Problem: Right sided community acquired PNA (pneumonia) with associated reactive airway disease. + hMPV viral infection also.   Received iv antibiotic inpatient. still reporting cough.   no Hypoxia on room air. No HOOVER.   ok to dc home. advised to have follow up with PCP/pulm in a week.     Problem/Plan - 2:  ·  Problem: chronic A-fib.   ·  Plan: metoprolol  eliquis.    Problem/Plan - 3:  ·  Problem: HTN (hypertension).   ·  Plan: losartan.    Problem/Plan - 4:  ·  Problem: Rheumatoid arthritis.   ·  Plan: methotrexate 15mg every sunday    Seen and examined by me today. Vitals stable.   I have discussed all the inpatient radiographic findings with the patient and stressed that patient follows with the PCP for further outpatient care. I have educated patient to use [facility] patient portal (as instructed on the discharge paperwork) to access medical records outside the hospital.   All questions welcomed and answered appropriately. Patient verbalized understanding of post discharge physician's follows up and discharge instructions.   DC time spent by me face to face excluding billable procedures 38 mins

## 2024-05-08 NOTE — DISCHARGE NOTE PROVIDER - NSDCFUADDINST_GEN_ALL_CORE_FT
1) It is important to see your primary physician as well as other necessary consultants within next 7-10 days to perform a comprehensive medical review.  Call their offices for an appointment as soon as you leave the hospital.  If you do not have a primary physician or unable to reach your PCP, contact the [facility] Physician Referral Service at [phone].  Your medical issues appear to be stable at this time, but if your symptoms recur or worsen, contact your physicians and/or return to the hospital if necessary.  If you encounter any issues or questions with your medication, call your physicians before stopping the medication.    2) Please access [facility] Patient portal (as instructed on the discharge paperwork) to access your medical records at any time after discharge.

## 2024-05-08 NOTE — DISCHARGE NOTE PROVIDER - NSDCMRMEDTOKEN_GEN_ALL_CORE_FT
albuterol 90 mcg/inh inhalation aerosol: 2 puff(s) inhaled every 6 hours as needed for sob or wheezing  amLODIPine 5 mg oral tablet: 1 tab(s) orally once a day  atorvastatin 10 mg oral tablet: 1 tab(s) orally once a day  Govind Low Dose 81 mg oral delayed release tablet: 1 tab(s) orally once a day  cefdinir 300 mg oral capsule: 1 cap(s) orally 2 times a day  clopidogrel 75 mg oral tablet: 1 tab(s) orally once a day  Eliquis 5 mg oral tablet: 1 tab(s) orally 2 times a day  folic acid 1 mg oral tablet: 1 tab(s) orally once a day  furosemide 20 mg oral tablet: 1 tab(s) orally once a day  GABAPENTIN 300 MG CAPSULE: 1 cap(s) orally 2 times a day TAKE 1 CAPSULE BY MOUTH TWICE A DAY  levothyroxine 50 mcg (0.05 mg) oral tablet: 1 tab(s) orally once a day  LOSARTAN POTASSIUM 100 MG TAB: 1 tab(s) orally once a day TAKE 1 TABLET BY MOUTH EVERY DAY  methotrexate 2.5 mg oral tablet: 6 tab(s) orally once a week on sunday  metoprolol tartrate 25 mg oral tablet: 1 tab(s) orally 2 times a day  potassium chloride 10 mEq oral tablet, extended release: 1 tab(s) orally once a day  predniSONE 10 mg oral tablet: 2 tab(s) orally once a day

## 2024-05-15 DIAGNOSIS — I48.20 CHRONIC ATRIAL FIBRILLATION, UNSPECIFIED: ICD-10-CM

## 2024-05-15 DIAGNOSIS — J12.3 HUMAN METAPNEUMOVIRUS PNEUMONIA: ICD-10-CM

## 2024-05-15 DIAGNOSIS — I73.9 PERIPHERAL VASCULAR DISEASE, UNSPECIFIED: ICD-10-CM

## 2024-05-15 DIAGNOSIS — Z85.3 PERSONAL HISTORY OF MALIGNANT NEOPLASM OF BREAST: ICD-10-CM

## 2024-05-15 DIAGNOSIS — M06.9 RHEUMATOID ARTHRITIS, UNSPECIFIED: ICD-10-CM

## 2024-05-15 DIAGNOSIS — Z92.3 PERSONAL HISTORY OF IRRADIATION: ICD-10-CM

## 2024-05-15 DIAGNOSIS — I10 ESSENTIAL (PRIMARY) HYPERTENSION: ICD-10-CM

## 2024-05-15 DIAGNOSIS — Z92.21 PERSONAL HISTORY OF ANTINEOPLASTIC CHEMOTHERAPY: ICD-10-CM

## 2024-05-15 DIAGNOSIS — Z79.01 LONG TERM (CURRENT) USE OF ANTICOAGULANTS: ICD-10-CM

## 2024-06-14 NOTE — PHYSICAL THERAPY INITIAL EVALUATION ADULT - ASSISTIVE DEVICE FOR TRANSFER: CHAIR/BED, REHAB EVAL
Attempted to reach patient via phone. Left message to call back.      Add all return call messages to encounter date 6/14/24 (DO NOT open a new encounter).      rolling walker

## 2024-07-30 ENCOUNTER — APPOINTMENT (OUTPATIENT)
Dept: RHEUMATOLOGY | Facility: CLINIC | Age: 84
End: 2024-07-30
Payer: MEDICARE

## 2024-07-30 VITALS
HEIGHT: 64 IN | SYSTOLIC BLOOD PRESSURE: 159 MMHG | WEIGHT: 155 LBS | OXYGEN SATURATION: 96 % | DIASTOLIC BLOOD PRESSURE: 72 MMHG | BODY MASS INDEX: 26.46 KG/M2 | HEART RATE: 71 BPM | RESPIRATION RATE: 16 BRPM

## 2024-07-30 DIAGNOSIS — M81.0 AGE-RELATED OSTEOPOROSIS W/OUT CURRENT PATHOLOGICAL FRACTURE: ICD-10-CM

## 2024-07-30 DIAGNOSIS — Z79.899 OTHER LONG TERM (CURRENT) DRUG THERAPY: ICD-10-CM

## 2024-07-30 DIAGNOSIS — I10 ESSENTIAL (PRIMARY) HYPERTENSION: ICD-10-CM

## 2024-07-30 DIAGNOSIS — Z79.631 LONG TERM (CURRENT) USE OF ANTIMETABOLITE AGENT: ICD-10-CM

## 2024-07-30 DIAGNOSIS — M06.9 RHEUMATOID ARTHRITIS, UNSPECIFIED: ICD-10-CM

## 2024-07-30 PROCEDURE — G2211 COMPLEX E/M VISIT ADD ON: CPT

## 2024-07-30 PROCEDURE — 99214 OFFICE O/P EST MOD 30 MIN: CPT

## 2024-07-30 RX ORDER — ZOLEDRONIC ACID 5 MG/100ML
5 INJECTION INTRAVENOUS
Refills: 0 | Status: ACTIVE | OUTPATIENT
Start: 2024-07-30

## 2024-07-30 RX ADMIN — ZOLEDRONIC ACID 0 MG/100ML: 5 INJECTION INTRAVENOUS at 00:00

## 2024-07-30 NOTE — HISTORY OF PRESENT ILLNESS
[FreeTextEntry1] : 1.  RA: chronic erosive and deforming disease, which has been treated with a variety of medicines.  Most recently Rituxan (last dose 10/22/2012) with a nice response.   Currently with pain in the right wrist as well as the the right ankle (same as previous visits). She is tolerating her medicines.  Her other joints are stable.  She feels that her disease is stable at the moment.  However, the damaged ankle/foot is responsible for increasing pain and the use of a cane.  She doesn't want at this point to think about surgery. In the fall 2016 she developed polyarticular pain and desired additional medicines. This flare responded to low dose prednisone, which she took for many weeks and then tapered off. She navigated a long trip through [place] and did quite well. Since that time she has traveled several times without incident (until COVID). Related to COVID, she stopped methotrexate in early 2020, but she noted no recrudescence of her RA. However, in late Aug 2021 she developed acute pain and swelling of the left wrist, which responded to low dose prednisone.  Whether this was an RA flare or perhaps pseudogout was unclear. She restarted methotrexate and has been quite content. In Oct 2022 she reported a flare in the left hand that spontaneously improved after a few days. After that time, her RA had been stable.  2.  Chronic methotrexate use: tolerated without dyspnea, GI symptoms, infection. She stopped methotrexate during COVID.  3.  Rituxan exposure: no infections or fevers 4.  ILD: no dyspnea 5.  Hypothyroidism 6.  Osteoporosis: had BMD (spine: osteopenia but values falsely elevated; hip could not be studied secondary to THR; wrist was osteoporotic-but this was a site a major involvement secondary to RA).  She was previously on Fosamax, but this was stopped because of a gastric or esophageal bleed. She received zoledronic acid 2015 through 2018.  Will hold off for now on additional zoledronic acid. A BMD in 2021 demonstrated normal values of the spine but abnormal values in the forearm (? effects of RA). A BMD in 2023 demonstrated osteoporosis of the wrist, but spine and hip were not performed. I am not sure that the wrist can be interpreted.  In 2024 it was decided to treat again with zoledronic acid given the inability to interpret the BMD. 7.  Vitamin D deficiency 8.  Possible bursitis:  History of two falls (hit left hip and face-s/p stitches).  X-rays per patient x 2 negative.  Pain on L lateral thigh.  No bruising. 9.  Clavicular fracture: fell during a trip in the summer 2014 and fractured her clavicle.  She received care in [place] and has engaged in physical therapy.  10.  Right ankle arthritis: obtained new orthotic for the right foot.  Pain has increased, and she has resorted to the use of a cane.  11.  Rotator cuff tear:  left.  Occurred in early 2016.  An X-ray and MRI were performed.  Therapy at first was restricted to physical therapy.  However, she underwent surgery, which she considered to be quite successful.  12.  Neck pain:  onset in early June 2017 without radicular or myelopathic symptoms. No trauma.  13.  URI's: onset in mid-Dec 2017 and again in April 2018; another in Apr 2024 marked by cough without fever or chest pain 14.  Breast cancer: in 2005 15.  Right knee pain:  two episodes of self-limited right knee pain in the summer 2018 without a history of trauma. 16.  Shingles: episode in the fall 2019 affecting the left flank.  She still refuses vaccination. Another episode involving the left flank occurred in 2022.  She received an anti-viral.  17.  Foot surgery: surgery on the right foot on March 3, 2020 at [facility].  18.  RLE paresthesias: onset in 2021, and she was anxious to try gabapentin. 19.  Bilateral LE edema: she was evaluated by cardiology and nephrology in 2022 for the swelling in her legs. Neither subspecialist believed that there were cardiac or renal causes. She was seen by vascular in early Jan 2023. She has an appointment with cardiology in Jul 2023.  20.  Hypertension: medicines changed in 2023.  21.  Peripheral vascular disease:  stents in her RLE obstructed.  She therefore underwent a bypass procedure in mid-2024.   Meds 1.  Methotrexate 15 mg po weekly  2.  Atorvastatin 10 mg po daily 3.  Synthroid 0.05 mg po daily 4.  Folate 1 mg po daily 5.  Losartan 50 mg/d 6.  Hydrochlorothiazide 12.5 mg po daily 7.  Ca/D stopped 8.  Vitamin D 1xd 1000 IU/d stopped 9.  ASA 81 mg/d (patient stopped) stopped 10.  Eliquis 5 mg 2xd 11.  Zoledronic acid (last September 2018) 12.  Metoprolol 25 mg/d 13. Amlodipine 5 mg/d  Vaccines PNVX  1/12/2010 PNVX 23  12/5/16  ([id number]; exp 11Jan2018) PNVX 23  3/28/22 ([id number]; exp 7/17/23) Prevnar 13 valent 2/9/15 ([id number]; exp 5/16) Flu vaccine received elsewhere in 2014 Quadrivalent fluzone 11/23/15  ( AA; exp 6/16) Fluzone 9/6/16  ([id number]; exp 17MAR17) Fluzone HD  9/14/17  ( AA; exp 6APR2018) Fluzone HD 11/12/18  ( AA; exp 29APR2019) Fluzone Quadrivalent 12/06/2021 ( AB; exp 6/30/2022) Fluzone Quadrivalent 10/25/2022 ( AD; exp 6/30/2023) Fluzone Quadrivalent 10/31/23 ([id number]; exp 6/2024)

## 2024-07-30 NOTE — ASSESSMENT
[FreeTextEntry1] : 1.  RA: chronic erosive and deforming disease, which has been treated with a variety of medicines.  Most recently Rituxan (last dose 10/22/2012) with a nice response.   Currently with pain in the right wrist as well as the the right ankle (same as previous visits). She is tolerating her medicines.  Her other joints are stable.  She feels that her disease is stable at the moment.  However, the damaged ankle/foot is responsible for increasing pain and the use of a cane.  She doesn't want at this point to think about surgery. In the fall 2016 she developed polyarticular pain and desired additional medicines. This flare responded to low dose prednisone, which she took for many weeks and then tapered off. She navigated a long trip through [place] and did quite well. Since that time she has traveled several times without incident (until COVID). Related to COVID, she stopped methotrexate in early 2020, but she noted no recrudescence of her RA. However, in late Aug 2021 she developed acute pain and swelling of the left wrist, which responded to low dose prednisone.  Whether this was an RA flare or perhaps pseudogout was unclear. She restarted methotrexate and has been quite content. In Oct 2022 she reported a flare in the left hand that spontaneously improved after a few days. After that time, her RA had been stable.  2.  Chronic methotrexate use: tolerated without dyspnea, GI symptoms, infection. She stopped methotrexate during COVID.  3.  Rituxan exposure: no infections or fevers 4.  ILD: no dyspnea 5.  Hypothyroidism 6.  Osteoporosis: had BMD (spine: osteopenia but values falsely elevated; hip could not be studied secondary to THR; wrist was osteoporotic-but this was a site a major involvement secondary to RA).  She was previously on Fosamax, but this was stopped because of a gastric or esophageal bleed. She received zoledronic acid 2015 through 2018.  Will hold off for now on additional zoledronic acid. A BMD in 2021 demonstrated normal values of the spine but abnormal values in the forearm (? effects of RA). A BMD in 2023 demonstrated osteoporosis of the wrist, but spine and hip were not performed. I am not sure that the wrist can be interpreted.  In 2024 it was decided to treat again with zoledronic acid given the inability to interpret the BMD. 7.  Vitamin D deficiency 8.  Possible bursitis:  History of two falls (hit left hip and face-s/p stitches).  X-rays per patient x 2 negative.  Pain on L lateral thigh.  No bruising. 9.  Clavicular fracture: fell during a trip in the summer 2014 and fractured her clavicle.  She received care in [place] and has engaged in physical therapy.  10.  Right ankle arthritis: obtained new orthotic for the right foot.  Pain has increased, and she has resorted to the use of a cane.  11.  Rotator cuff tear:  left.  Occurred in early 2016.  An X-ray and MRI were performed.  Therapy at first was restricted to physical therapy.  However, she underwent surgery, which she considered to be quite successful.  12.  Neck pain:  onset in early June 2017 without radicular or myelopathic symptoms. No trauma.  13.  URI's: onset in mid-Dec 2017 and again in April 2018; another in Apr 2024 marked by cough without fever or chest pain 14.  Breast cancer: in 2005 15.  Right knee pain:  two episodes of self-limited right knee pain in the summer 2018 without a history of trauma. 16.  Shingles: episode in the fall 2019 affecting the left flank.  She still refuses vaccination. Another episode involving the left flank occurred in 2022.  She received an anti-viral.  17.  Foot surgery: surgery on the right foot on March 3, 2020 at [facility].  18.  RLE paresthesias: onset in 2021, and she was anxious to try gabapentin. 19.  Bilateral LE edema: she was evaluated by cardiology and nephrology in 2022 for the swelling in her legs. Neither subspecialist believed that there were cardiac or renal causes. She was seen by vascular in early Jan 2023. She has an appointment with cardiology in Jul 2023.  20.  Hypertension: medicines changed in 2023.  21.  Peripheral vascular disease:  stents in her RLE obstructed.  She therefore underwent a bypass procedure in mid-2024.   Plan Lab tests Reviewed internal and/or external records  Contact me Arrange zoledronic acid Shingrix vaccine recommended Rheumatoid Arthritis, referred to as RA, is a chronic autoimmune disease that can affect any organ in the body posing threats to proper organ function and even to life. Although the primary target are the joints, close surveillance of all bodily functions is required, including but not limited to the peripheral nervous system, ocular and auditory systems, cardiopulmonary function, kidney function, mucocutaneous and musculoskeletal systems as well as constitutional manifestations. Surveillance consists of history, physical, and laboratory tests. Treatment varies, but most of the drugs used are high risk and therefore also require close monitoring in the form of blood and urine tests. High risk medications used in the treatment of rheumatic diseases include steroids, disease modifying agents, immunosuppressive therapies, antimalarials, biologics, and chemotherapy. Regardless of which drug or class of drug, the potential toxicities of these therapies mandate close monitoring in the form of a history, physical, and laboratory tests. Return 3 months

## 2024-07-30 NOTE — DISCUSSION/SUMMARY
[FreeTextEntry1] : 1.  RA: chronic erosive and deforming disease, which has been treated with a variety of medicines.  Most recently Rituxan (last dose 10/22/2012) with a nice response.   Currently with pain in the right wrist as well as the the right ankle (same as previous visits). She is tolerating her medicines.  Her other joints are stable.  She feels that her disease is stable at the moment.\par  2.  Chronic methotrexate use: tolerating without dyspnea, GI symptoms, infection\par  3.  Rituxan exposure: no infections or fevers\par  4.  ILD: no dyspnea\par  5.  Hypothyroidism\par  6.  Osteoporosis: had BMD (spine: osteopenia but values falsely elevated; hip could not be studied secondary to THR; wrist was osteoporotic-but this was a site a major involvement secondary to RA).  She was previously on Fosamax, but this was stopped because of a gastric or esophageal bleed. \par  7.  Vitamin D deficiency\par  8.  Possible bursitis:  History of two falls (hit left hip and face-s/p stitches).  X-rays per patient x 2 negative.  Pain on L lateral thigh.  No bruising.\par  9.  Clavicular fracture: fell during a trip in the summer 2014 and fractured her clavicle.  She received care in [place] and has engaged in physical therapy. \par  10.  Right ankle arthritis: obtained new orthotic for the right foot.  It is too early to determine its effectiveness.\par  11.  Peripheral vascular disease:  underwent stenting in the right LE.\par  \par  Plan\par  1.  Continue MTX 15 mg weekly\par  2.  Labs today\par  3.  Consider repeat Rituxan; however, I would prefer to wait until there is a significant flare\par  4. Return 3 months

## 2024-07-31 LAB
25(OH)D3 SERPL-MCNC: 31.9 NG/ML
ALBUMIN SERPL ELPH-MCNC: 4.3 G/DL
ALP BLD-CCNC: 142 U/L
ALT SERPL-CCNC: 9 U/L
ANION GAP SERPL CALC-SCNC: 14 MMOL/L
AST SERPL-CCNC: 19 U/L
BASOPHILS # BLD AUTO: 0.06 K/UL
BASOPHILS NFR BLD AUTO: 0.6 %
BILIRUB SERPL-MCNC: 1 MG/DL
BUN SERPL-MCNC: 16 MG/DL
CALCIUM SERPL-MCNC: 9.7 MG/DL
CHLORIDE SERPL-SCNC: 102 MMOL/L
CO2 SERPL-SCNC: 28 MMOL/L
CREAT SERPL-MCNC: 1.14 MG/DL
CRP SERPL-MCNC: 25 MG/L
EGFR: 47 ML/MIN/1.73M2
EOSINOPHIL # BLD AUTO: 0.4 K/UL
EOSINOPHIL NFR BLD AUTO: 4.3 %
HCT VFR BLD CALC: 34.9 %
HGB BLD-MCNC: 10.7 G/DL
IMM GRANULOCYTES NFR BLD AUTO: 0.4 %
LYMPHOCYTES # BLD AUTO: 1.23 K/UL
LYMPHOCYTES NFR BLD AUTO: 13.1 %
MAN DIFF?: NORMAL
MCHC RBC-ENTMCNC: 30.7 GM/DL
MCHC RBC-ENTMCNC: 30.7 PG
MCV RBC AUTO: 100.3 FL
MONOCYTES # BLD AUTO: 0.83 K/UL
MONOCYTES NFR BLD AUTO: 8.8 %
NEUTROPHILS # BLD AUTO: 6.82 K/UL
NEUTROPHILS NFR BLD AUTO: 72.8 %
PLATELET # BLD AUTO: 292 K/UL
POTASSIUM SERPL-SCNC: 3.9 MMOL/L
PROT SERPL-MCNC: 6.5 G/DL
RBC # BLD: 3.48 M/UL
RBC # FLD: 18.3 %
SODIUM SERPL-SCNC: 144 MMOL/L
WBC # FLD AUTO: 9.38 K/UL

## 2024-09-04 ENCOUNTER — NON-APPOINTMENT (OUTPATIENT)
Age: 84
End: 2024-09-04

## 2024-09-05 ENCOUNTER — APPOINTMENT (OUTPATIENT)
Dept: RHEUMATOLOGY | Facility: CLINIC | Age: 84
End: 2024-09-05
Payer: MEDICARE

## 2024-09-05 VITALS
SYSTOLIC BLOOD PRESSURE: 149 MMHG | DIASTOLIC BLOOD PRESSURE: 79 MMHG | TEMPERATURE: 97.6 F | OXYGEN SATURATION: 99 % | RESPIRATION RATE: 16 BRPM | HEART RATE: 60 BPM

## 2024-09-05 VITALS
OXYGEN SATURATION: 99 % | DIASTOLIC BLOOD PRESSURE: 80 MMHG | HEART RATE: 78 BPM | RESPIRATION RATE: 16 BRPM | SYSTOLIC BLOOD PRESSURE: 148 MMHG

## 2024-09-05 PROCEDURE — 96374 THER/PROPH/DIAG INJ IV PUSH: CPT

## 2024-09-05 RX ORDER — ZOLEDRONIC ACID 5 MG/100ML
5 INJECTION INTRAVENOUS
Qty: 0 | Refills: 0 | Status: COMPLETED
Start: 2024-07-30

## 2024-09-05 NOTE — HISTORY OF PRESENT ILLNESS
[Denies] : Denies [No] : No [N/A] : N/A [Yes] : Yes [Left upper extremity] : Left upper extremity [24g] : 24g [Medication Name: ___] : Medication Name: [unfilled] [Total Amount Administered: ___] : Total Amount Administered: [unfilled] [IV discontinued. Intact. No signs or symptoms of IV complications noted. Time: ___] : IV discontinued. Intact. No signs or symptoms of IV complications noted. Time: [unfilled] [Patient  instructed to seek medical attention with signs and symptoms of adverse effects] : Patient  instructed to seek medical attention with signs and symptoms of adverse effects [Patient left unit in no acute distress] : Patient left unit in no acute distress [Medications administered as ordered and tolerated well.] : Medications administered as ordered and tolerated well. [Start Time: ___] : Medication Start Time: [unfilled] [End Time: ___] : Medication End Time: [unfilled] [de-identified] : left basilic vein [de-identified] : Patient ambulated onto the unit in NAD for scheduled Zoledronic Acid infusion. Discharge instructions reviewed with patient at the chairside, patient verbalized understanding. Patient tolerated infusion well. Patient ambulated off the unit in NAD upon medication completion.

## 2024-10-29 ENCOUNTER — APPOINTMENT (OUTPATIENT)
Dept: RHEUMATOLOGY | Facility: CLINIC | Age: 84
End: 2024-10-29
Payer: MEDICARE

## 2024-10-29 DIAGNOSIS — M81.0 AGE-RELATED OSTEOPOROSIS W/OUT CURRENT PATHOLOGICAL FRACTURE: ICD-10-CM

## 2024-10-29 DIAGNOSIS — M06.9 RHEUMATOID ARTHRITIS, UNSPECIFIED: ICD-10-CM

## 2024-10-29 DIAGNOSIS — Z79.899 OTHER LONG TERM (CURRENT) DRUG THERAPY: ICD-10-CM

## 2024-10-29 DIAGNOSIS — Z79.60 LONG TERM (CURRENT) USE OF UNSPECIFIED IMMUNOMODULATORS AND IMMUNOSUPPRESSANTS: ICD-10-CM

## 2024-10-29 DIAGNOSIS — I10 ESSENTIAL (PRIMARY) HYPERTENSION: ICD-10-CM

## 2024-10-29 DIAGNOSIS — Z79.631 LONG TERM (CURRENT) USE OF ANTIMETABOLITE AGENT: ICD-10-CM

## 2024-10-29 PROCEDURE — 90662 IIV NO PRSV INCREASED AG IM: CPT

## 2024-10-29 PROCEDURE — 99214 OFFICE O/P EST MOD 30 MIN: CPT

## 2024-10-29 PROCEDURE — G0008: CPT

## 2024-10-29 PROCEDURE — G2211 COMPLEX E/M VISIT ADD ON: CPT

## 2024-10-30 LAB
ALBUMIN SERPL ELPH-MCNC: 4.1 G/DL
ALP BLD-CCNC: 120 U/L
ALT SERPL-CCNC: 13 U/L
ANION GAP SERPL CALC-SCNC: 11 MMOL/L
AST SERPL-CCNC: 18 U/L
BASOPHILS # BLD AUTO: 0.06 K/UL
BASOPHILS NFR BLD AUTO: 0.8 %
BILIRUB SERPL-MCNC: 0.9 MG/DL
BUN SERPL-MCNC: 17 MG/DL
CALCIUM SERPL-MCNC: 9.4 MG/DL
CHLORIDE SERPL-SCNC: 99 MMOL/L
CO2 SERPL-SCNC: 31 MMOL/L
CREAT SERPL-MCNC: 1.04 MG/DL
CRP SERPL-MCNC: 15 MG/L
EGFR: 53 ML/MIN/1.73M2
EOSINOPHIL # BLD AUTO: 0.3 K/UL
EOSINOPHIL NFR BLD AUTO: 3.8 %
FOLATE SERPL-MCNC: >20 NG/ML
HCT VFR BLD CALC: 33.6 %
HGB BLD-MCNC: 11 G/DL
IMM GRANULOCYTES NFR BLD AUTO: 0.3 %
IRON SATN MFR SERPL: 59 %
IRON SERPL-MCNC: 202 UG/DL
LYMPHOCYTES # BLD AUTO: 1.05 K/UL
LYMPHOCYTES NFR BLD AUTO: 13.2 %
MAN DIFF?: NORMAL
MCHC RBC-ENTMCNC: 32.3 PG
MCHC RBC-ENTMCNC: 32.7 G/DL
MCV RBC AUTO: 98.5 FL
MONOCYTES # BLD AUTO: 0.66 K/UL
MONOCYTES NFR BLD AUTO: 8.3 %
NEUTROPHILS # BLD AUTO: 5.84 K/UL
NEUTROPHILS NFR BLD AUTO: 73.6 %
PLATELET # BLD AUTO: 233 K/UL
POTASSIUM SERPL-SCNC: 3.5 MMOL/L
PROT SERPL-MCNC: 6.4 G/DL
RBC # BLD: 3.41 M/UL
RBC # FLD: 18.1 %
SODIUM SERPL-SCNC: 141 MMOL/L
TIBC SERPL-MCNC: 341 UG/DL
UIBC SERPL-MCNC: 139 UG/DL
VIT B12 SERPL-MCNC: >2000 PG/ML
WBC # FLD AUTO: 7.93 K/UL

## 2025-02-04 ENCOUNTER — APPOINTMENT (OUTPATIENT)
Dept: RHEUMATOLOGY | Facility: CLINIC | Age: 85
End: 2025-02-04
Payer: MEDICARE

## 2025-02-04 VITALS
HEIGHT: 64 IN | BODY MASS INDEX: 26.46 KG/M2 | DIASTOLIC BLOOD PRESSURE: 84 MMHG | OXYGEN SATURATION: 97 % | SYSTOLIC BLOOD PRESSURE: 155 MMHG | HEART RATE: 67 BPM | WEIGHT: 155 LBS

## 2025-02-04 DIAGNOSIS — M06.9 RHEUMATOID ARTHRITIS, UNSPECIFIED: ICD-10-CM

## 2025-02-04 DIAGNOSIS — Z79.631 LONG TERM (CURRENT) USE OF ANTIMETABOLITE AGENT: ICD-10-CM

## 2025-02-04 DIAGNOSIS — I10 ESSENTIAL (PRIMARY) HYPERTENSION: ICD-10-CM

## 2025-02-04 DIAGNOSIS — Z79.899 OTHER LONG TERM (CURRENT) DRUG THERAPY: ICD-10-CM

## 2025-02-04 DIAGNOSIS — Z79.60 LONG TERM (CURRENT) USE OF UNSPECIFIED IMMUNOMODULATORS AND IMMUNOSUPPRESSANTS: ICD-10-CM

## 2025-02-04 PROCEDURE — G2211 COMPLEX E/M VISIT ADD ON: CPT

## 2025-02-04 PROCEDURE — 99214 OFFICE O/P EST MOD 30 MIN: CPT

## 2025-02-05 LAB
25(OH)D3 SERPL-MCNC: 30.3 NG/ML
ALBUMIN SERPL ELPH-MCNC: 4.1 G/DL
ALP BLD-CCNC: 130 U/L
ALT SERPL-CCNC: 16 U/L
ANION GAP SERPL CALC-SCNC: 15 MMOL/L
AST SERPL-CCNC: 24 U/L
BASOPHILS # BLD AUTO: 0.04 K/UL
BASOPHILS NFR BLD AUTO: 0.5 %
BILIRUB SERPL-MCNC: 1 MG/DL
BUN SERPL-MCNC: 21 MG/DL
CALCIUM SERPL-MCNC: 9.1 MG/DL
CHLORIDE SERPL-SCNC: 103 MMOL/L
CO2 SERPL-SCNC: 26 MMOL/L
CREAT SERPL-MCNC: 1.04 MG/DL
CRP SERPL-MCNC: 18 MG/L
EGFR: 53 ML/MIN/1.73M2
EOSINOPHIL # BLD AUTO: 0.27 K/UL
EOSINOPHIL NFR BLD AUTO: 3.1 %
HCT VFR BLD CALC: 33.5 %
HGB BLD-MCNC: 10.5 G/DL
IMM GRANULOCYTES NFR BLD AUTO: 0.5 %
LYMPHOCYTES # BLD AUTO: 0.92 K/UL
LYMPHOCYTES NFR BLD AUTO: 10.7 %
MAN DIFF?: NORMAL
MCHC RBC-ENTMCNC: 31.3 G/DL
MCHC RBC-ENTMCNC: 32.3 PG
MCV RBC AUTO: 103.1 FL
MONOCYTES # BLD AUTO: 0.65 K/UL
MONOCYTES NFR BLD AUTO: 7.6 %
NEUTROPHILS # BLD AUTO: 6.68 K/UL
NEUTROPHILS NFR BLD AUTO: 77.6 %
PLATELET # BLD AUTO: 264 K/UL
POTASSIUM SERPL-SCNC: 3.9 MMOL/L
PROT SERPL-MCNC: 6.4 G/DL
RBC # BLD: 3.25 M/UL
RBC # FLD: 16.9 %
SODIUM SERPL-SCNC: 143 MMOL/L
WBC # FLD AUTO: 8.6 K/UL

## 2025-06-17 ENCOUNTER — APPOINTMENT (OUTPATIENT)
Dept: RHEUMATOLOGY | Facility: CLINIC | Age: 85
End: 2025-06-17

## 2025-07-22 ENCOUNTER — LABORATORY RESULT (OUTPATIENT)
Age: 85
End: 2025-07-22

## 2025-07-22 ENCOUNTER — APPOINTMENT (OUTPATIENT)
Dept: RHEUMATOLOGY | Facility: CLINIC | Age: 85
End: 2025-07-22
Payer: MEDICARE

## 2025-07-22 VITALS
DIASTOLIC BLOOD PRESSURE: 78 MMHG | HEART RATE: 76 BPM | WEIGHT: 155 LBS | HEIGHT: 64 IN | OXYGEN SATURATION: 98 % | BODY MASS INDEX: 26.46 KG/M2 | SYSTOLIC BLOOD PRESSURE: 164 MMHG

## 2025-07-22 DIAGNOSIS — I10 ESSENTIAL (PRIMARY) HYPERTENSION: ICD-10-CM

## 2025-07-22 DIAGNOSIS — Z79.60 LONG TERM (CURRENT) USE OF UNSPECIFIED IMMUNOMODULATORS AND IMMUNOSUPPRESSANTS: ICD-10-CM

## 2025-07-22 DIAGNOSIS — Z79.899 OTHER LONG TERM (CURRENT) DRUG THERAPY: ICD-10-CM

## 2025-07-22 DIAGNOSIS — R60.0 LOCALIZED EDEMA: ICD-10-CM

## 2025-07-22 DIAGNOSIS — M06.9 RHEUMATOID ARTHRITIS, UNSPECIFIED: ICD-10-CM

## 2025-07-22 DIAGNOSIS — Z79.631 LONG TERM (CURRENT) USE OF ANTIMETABOLITE AGENT: ICD-10-CM

## 2025-07-22 DIAGNOSIS — M81.0 AGE-RELATED OSTEOPOROSIS W/OUT CURRENT PATHOLOGICAL FRACTURE: ICD-10-CM

## 2025-07-22 PROCEDURE — G2211 COMPLEX E/M VISIT ADD ON: CPT

## 2025-07-22 PROCEDURE — 99214 OFFICE O/P EST MOD 30 MIN: CPT

## 2025-07-22 RX ORDER — ZOLEDRONIC ACID 5 MG/100ML
5 INJECTION INTRAVENOUS
Refills: 0 | Status: ACTIVE | OUTPATIENT
Start: 2025-07-22

## 2025-07-22 RX ORDER — GABAPENTIN 300 MG/1
300 CAPSULE ORAL
Qty: 180 | Refills: 3 | Status: ACTIVE | COMMUNITY
Start: 2025-07-22 | End: 1900-01-01

## 2025-07-22 RX ADMIN — ZOLEDRONIC ACID 0 MG/100ML: 5 INJECTION INTRAVENOUS at 00:00

## 2025-07-23 LAB
25(OH)D3 SERPL-MCNC: 29.2 NG/ML
ALBUMIN SERPL ELPH-MCNC: 4.1 G/DL
ALP BLD-CCNC: 123 U/L
ALT SERPL-CCNC: 19 U/L
ANION GAP SERPL CALC-SCNC: 14 MMOL/L
AST SERPL-CCNC: 35 U/L
BASOPHILS # BLD AUTO: 0.04 K/UL
BASOPHILS NFR BLD AUTO: 0.5 %
BILIRUB SERPL-MCNC: 1.2 MG/DL
BUN SERPL-MCNC: 17 MG/DL
CALCIUM SERPL-MCNC: 9 MG/DL
CHLORIDE SERPL-SCNC: 102 MMOL/L
CO2 SERPL-SCNC: 24 MMOL/L
CREAT SERPL-MCNC: 0.95 MG/DL
CRP SERPL-MCNC: 20 MG/L
EGFRCR SERPLBLD CKD-EPI 2021: 58 ML/MIN/1.73M2
EOSINOPHIL # BLD AUTO: 0.25 K/UL
EOSINOPHIL NFR BLD AUTO: 3.3 %
HCT VFR BLD CALC: 26.6 %
HGB BLD-MCNC: 8 G/DL
IMM GRANULOCYTES NFR BLD AUTO: 0.1 %
IRON SATN MFR SERPL: 40 %
IRON SERPL-MCNC: 129 UG/DL
LYMPHOCYTES # BLD AUTO: 0.68 K/UL
LYMPHOCYTES NFR BLD AUTO: 9 %
MAN DIFF?: NORMAL
MCHC RBC-ENTMCNC: 30.1 G/DL
MCHC RBC-ENTMCNC: 30.5 PG
MCV RBC AUTO: 101.5 FL
MONOCYTES # BLD AUTO: 0.53 K/UL
MONOCYTES NFR BLD AUTO: 7 %
NEUTROPHILS # BLD AUTO: 6.03 K/UL
NEUTROPHILS NFR BLD AUTO: 80.1 %
PLATELET # BLD AUTO: 216 K/UL
POTASSIUM SERPL-SCNC: 3.6 MMOL/L
PROT SERPL-MCNC: 6.1 G/DL
RBC # BLD: 2.62 M/UL
RBC # FLD: 22.5 %
SODIUM SERPL-SCNC: 141 MMOL/L
TIBC SERPL-MCNC: 320 UG/DL
UIBC SERPL-MCNC: 191 UG/DL
WBC # FLD AUTO: 7.54 K/UL

## 2025-09-09 ENCOUNTER — APPOINTMENT (OUTPATIENT)
Dept: RHEUMATOLOGY | Facility: CLINIC | Age: 85
End: 2025-09-09
Payer: MEDICARE

## 2025-09-09 VITALS
OXYGEN SATURATION: 98 % | TEMPERATURE: 98 F | DIASTOLIC BLOOD PRESSURE: 79 MMHG | SYSTOLIC BLOOD PRESSURE: 148 MMHG | HEART RATE: 62 BPM | RESPIRATION RATE: 16 BRPM

## 2025-09-09 VITALS
DIASTOLIC BLOOD PRESSURE: 79 MMHG | SYSTOLIC BLOOD PRESSURE: 154 MMHG | OXYGEN SATURATION: 97 % | HEART RATE: 56 BPM | RESPIRATION RATE: 16 BRPM

## 2025-09-09 PROCEDURE — 96374 THER/PROPH/DIAG INJ IV PUSH: CPT

## 2025-09-09 RX ORDER — ZOLEDRONIC ACID 5 MG/100ML
5 INJECTION INTRAVENOUS
Qty: 0 | Refills: 0 | Status: COMPLETED
Start: 2025-07-22